# Patient Record
Sex: FEMALE | Race: WHITE
[De-identification: names, ages, dates, MRNs, and addresses within clinical notes are randomized per-mention and may not be internally consistent; named-entity substitution may affect disease eponyms.]

---

## 2017-06-20 ENCOUNTER — HOSPITAL ENCOUNTER (INPATIENT)
Dept: HOSPITAL 47 - 6SEL | Age: 82
LOS: 3 days | Discharge: HOME | DRG: 287 | End: 2017-06-23
Attending: INTERNAL MEDICINE | Admitting: INTERNAL MEDICINE
Payer: COMMERCIAL

## 2017-06-20 DIAGNOSIS — I51.81: Primary | ICD-10-CM

## 2017-06-20 DIAGNOSIS — R11.2: ICD-10-CM

## 2017-06-20 DIAGNOSIS — Z88.5: ICD-10-CM

## 2017-06-20 DIAGNOSIS — K04.7: ICD-10-CM

## 2017-06-20 DIAGNOSIS — I25.10: ICD-10-CM

## 2017-06-20 DIAGNOSIS — I48.2: ICD-10-CM

## 2017-06-20 DIAGNOSIS — I49.5: ICD-10-CM

## 2017-06-20 DIAGNOSIS — Z88.1: ICD-10-CM

## 2017-06-20 DIAGNOSIS — I48.0: ICD-10-CM

## 2017-06-20 DIAGNOSIS — Z79.01: ICD-10-CM

## 2017-06-20 DIAGNOSIS — R06.02: ICD-10-CM

## 2017-06-20 DIAGNOSIS — Z95.0: ICD-10-CM

## 2017-06-20 DIAGNOSIS — I10: ICD-10-CM

## 2017-06-20 DIAGNOSIS — Z79.899: ICD-10-CM

## 2017-06-20 DIAGNOSIS — R74.8: ICD-10-CM

## 2017-06-20 PROCEDURE — 93458 L HRT ARTERY/VENTRICLE ANGIO: CPT

## 2017-06-20 PROCEDURE — 85610 PROTHROMBIN TIME: CPT

## 2017-06-20 PROCEDURE — 85025 COMPLETE CBC W/AUTO DIFF WBC: CPT

## 2017-06-20 PROCEDURE — 80048 BASIC METABOLIC PNL TOTAL CA: CPT

## 2017-06-20 PROCEDURE — 94640 AIRWAY INHALATION TREATMENT: CPT

## 2017-06-20 RX ADMIN — NITROGLYCERIN SCH MG: 6.5 CAPSULE ORAL at 21:31

## 2017-06-20 RX ADMIN — ISODIUM CHLORIDE PRN MG: 0.03 SOLUTION RESPIRATORY (INHALATION) at 20:35

## 2017-06-20 RX ADMIN — PENICILLIN V POTASSIUM SCH MG: 250 TABLET, FILM COATED ORAL at 21:32

## 2017-06-20 RX ADMIN — BUDESONIDE SCH MG: 0.5 INHALANT ORAL at 20:35

## 2017-06-20 RX ADMIN — ACETAMINOPHEN PRN MG: 325 TABLET, FILM COATED ORAL at 18:28

## 2017-06-20 RX ADMIN — CEFAZOLIN SCH MLS/HR: 330 INJECTION, POWDER, FOR SOLUTION INTRAMUSCULAR; INTRAVENOUS at 18:30

## 2017-06-20 RX ADMIN — FLECAINIDE ACETATE SCH MG: 50 TABLET ORAL at 21:31

## 2017-06-21 LAB
ANION GAP SERPL CALC-SCNC: 11 MMOL/L
BASOPHILS # BLD AUTO: 0.1 K/UL (ref 0–0.2)
BASOPHILS NFR BLD AUTO: 1 %
BUN SERPL-SCNC: 14 MG/DL (ref 7–17)
CALCIUM SPEC-MCNC: 8.8 MG/DL (ref 8.4–10.2)
CH: 29.7
CHCM: 32
CHLORIDE SERPL-SCNC: 103 MMOL/L (ref 98–107)
CO2 SERPL-SCNC: 25 MMOL/L (ref 22–30)
EOSINOPHIL # BLD AUTO: 0.1 K/UL (ref 0–0.7)
EOSINOPHIL NFR BLD AUTO: 1 %
ERYTHROCYTE [DISTWIDTH] IN BLOOD BY AUTOMATED COUNT: 3.86 M/UL (ref 3.8–5.4)
ERYTHROCYTE [DISTWIDTH] IN BLOOD: 14.5 % (ref 11.5–15.5)
GLUCOSE SERPL-MCNC: 113 MG/DL (ref 74–99)
HCT VFR BLD AUTO: 36.1 % (ref 34–46)
HDW: 2.3
HGB BLD-MCNC: 11.4 GM/DL (ref 11.4–16)
INR PPP: 1.4 (ref ?–1.1)
LUC NFR BLD AUTO: 2 %
LYMPHOCYTES # SPEC AUTO: 1.3 K/UL (ref 1–4.8)
LYMPHOCYTES NFR SPEC AUTO: 17 %
MCH RBC QN AUTO: 29.4 PG (ref 25–35)
MCHC RBC AUTO-ENTMCNC: 31.5 G/DL (ref 31–37)
MCV RBC AUTO: 93.6 FL (ref 80–100)
MONOCYTES # BLD AUTO: 0.5 K/UL (ref 0–1)
MONOCYTES NFR BLD AUTO: 7 %
NEUTROPHILS # BLD AUTO: 5.3 K/UL (ref 1.3–7.7)
NEUTROPHILS NFR BLD AUTO: 71 %
NON-AFRICAN AMERICAN GFR(MDRD): 57
POTASSIUM SERPL-SCNC: 3.4 MMOL/L (ref 3.5–5.1)
PT BLD: 13.7 SEC (ref 9–12)
SODIUM SERPL-SCNC: 139 MMOL/L (ref 137–145)
WBC # BLD AUTO: 0.18 10*3/UL
WBC # BLD AUTO: 7.4 K/UL (ref 3.8–10.6)
WBC (PEROX): 7.52

## 2017-06-21 PROCEDURE — B2111ZZ FLUOROSCOPY OF MULTIPLE CORONARY ARTERIES USING LOW OSMOLAR CONTRAST: ICD-10-PCS

## 2017-06-21 PROCEDURE — 4A023N7 MEASUREMENT OF CARDIAC SAMPLING AND PRESSURE, LEFT HEART, PERCUTANEOUS APPROACH: ICD-10-PCS

## 2017-06-21 PROCEDURE — B2151ZZ FLUOROSCOPY OF LEFT HEART USING LOW OSMOLAR CONTRAST: ICD-10-PCS

## 2017-06-21 RX ADMIN — BUDESONIDE SCH MG: 0.5 INHALANT ORAL at 07:31

## 2017-06-21 RX ADMIN — ACETAMINOPHEN PRN MG: 325 TABLET, FILM COATED ORAL at 03:05

## 2017-06-21 RX ADMIN — Medication SCH UNIT: at 18:49

## 2017-06-21 RX ADMIN — PROPRANOLOL HYDROCHLORIDE SCH MG: 80 CAPSULE, EXTENDED RELEASE ORAL at 06:11

## 2017-06-21 RX ADMIN — PENICILLIN V POTASSIUM SCH MG: 250 TABLET, FILM COATED ORAL at 06:11

## 2017-06-21 RX ADMIN — NITROGLYCERIN SCH MG: 6.5 CAPSULE ORAL at 20:04

## 2017-06-21 RX ADMIN — ASPIRIN 81 MG CHEWABLE TABLET SCH MG: 81 TABLET CHEWABLE at 06:09

## 2017-06-21 RX ADMIN — PENICILLIN V POTASSIUM SCH MG: 250 TABLET, FILM COATED ORAL at 20:04

## 2017-06-21 RX ADMIN — NITROGLYCERIN SCH MG: 6.5 CAPSULE ORAL at 06:10

## 2017-06-21 RX ADMIN — FAMOTIDINE SCH MG: 20 TABLET, FILM COATED ORAL at 06:10

## 2017-06-21 RX ADMIN — FLECAINIDE ACETATE SCH MG: 50 TABLET ORAL at 20:04

## 2017-06-21 RX ADMIN — ACETAMINOPHEN PRN MG: 325 TABLET, FILM COATED ORAL at 20:03

## 2017-06-21 RX ADMIN — CEFAZOLIN SCH MLS/HR: 330 INJECTION, POWDER, FOR SOLUTION INTRAMUSCULAR; INTRAVENOUS at 06:09

## 2017-06-21 RX ADMIN — BUDESONIDE SCH: 0.5 INHALANT ORAL at 20:39

## 2017-06-21 RX ADMIN — FLECAINIDE ACETATE SCH MG: 50 TABLET ORAL at 06:10

## 2017-06-21 RX ADMIN — LOSARTAN POTASSIUM SCH MG: 50 TABLET, FILM COATED ORAL at 06:10

## 2017-06-21 RX ADMIN — ISODIUM CHLORIDE PRN MG: 0.03 SOLUTION RESPIRATORY (INHALATION) at 07:31

## 2017-06-21 RX ADMIN — CEFAZOLIN SCH MLS/HR: 330 INJECTION, POWDER, FOR SOLUTION INTRAMUSCULAR; INTRAVENOUS at 23:24

## 2017-06-21 NOTE — CC
DATE OF SERVICE:  





Ms. Joseph is an 83-year-old female with a known history of paroxysmal 

atrial fibrillation and permanent pacemaker implantation who presented 

to [facility] with symptoms of throat and chest 

discomfort, had minimal troponin elevation. She underwent an 

echocardiogram that revealed apical hypokinesis and her stress test 

showed evidence of a defect in the inferior wall with no significant 

ischemia. Those findings are new compared with her prior work-up and 

based on that, recommendation was made regarding cardiac 

catheterization. The procedure as well as the risks and complications 

were discussed with the patient who is in full understanding and 

agreement.  



PROCEDURE: Patient was brought to the cath lab in fasting semisedated 

state after receiving fentanyl and Benadryl and achieving moderate 

conscious sedated state, a 6 Spanish sheath was introduced in the right 

radial artery. Selective right and left coronary angiography was 

performed using 5 Spanish 3-1/2 Bend right and left Denis catheter. 

Multiple views of the coronary arteries including hemiaxial views were 

obtained. Following that, a 5 Spanish tight pigtail catheter was 

introduced into the left ventricle and a  30 degrees BURNS view of the 

left ventricle was obtained. Following that, catheter and sheaths were 

removed. Hemostasis was obtained with deployment of TR band. There 

were no immediate complications. Patient was returned to her room in 

stable condition. Of note, the patient received 4000 units of 

intravenous heparin as well as intra-arterial verapamil.  



FINDINGS:

LEFT MAIN: This is a large-size vessel bifurcating into left 

circumflex artery, left main coronary artery is without any 

significant obstructive coronary artery disease.  





LEFT ANTERIOR DESCENDING CORONARY ARTERY: This is a large-size vessel 

reaching towards the apex with a wrap around the apex segment, giving 

rise to a small size diagonal branch proximally and the proximal 

segment. There is 20 to 30% plaque. The rest of the vessel has no 

high-grade stenosis.  



LEFT CIRCUMFLEX: This is a nondominant vessel, large in caliber, 

giving rise to 2 obtuse marginal branches. The left circumflex has 

mild intimal disease of 20- 40% without any evidence of high-grade 

stenosis.  



RIGHT CORONARY ARTERY: This is a large dominant vessel, bifurcating 

into PDA and posterolateral segment and branches. The right coronary 

artery has mild plaque in the mid segment with 30%, the rest of the 

vessel has no high-grade stenosis.  



LEFT VENTRICULOGRAM: Left ventriculogram was performed in 30 degrees 

BURNS view and revealed anteroapical akinesis with an ejection fraction 

of 35%. There was no significant mitral regurgitation.  



HEMODYNAMICS: There was no gradient across the aortic valve. The left 

ventricle end-diastolic pressure was 20 mmHg.   



CONCLUSION:

1. Mild to moderate triple vessel coronary artery disease.

2. Anteroapical akinesis with severely impaired left ventricular systolic 
function. 



RECOMMENDATIONS: Those findings are consistent with takotsubo 

syndrome. I will maximize her medical therapy and depending on her 

progress, further recommendation will be made.  Those findings and 

recommendations were discussed with the patient and her family who are 

in full understanding and  agreement.  



Duration of the procedure:   20 minutes. 



[place]

## 2017-06-21 NOTE — LTR
June 21, 2017













RE:  OpalSusan







Dear Dr. Rees:



I had the pleasure of performing cardiac catheterization on Mr. Joseph 

at [facility] on the 21st of June and a full copy of 

procedure note will be forwarded to you.   



In brief, she was found to have mild to moderate triple vessel 

coronary artery disease with significant anteroapical hypokinesis 

consistent with takotsubo syndrome and based on those findings, I 

recommend to continue medical therapy with optimizing her treatment 

and depending on her progress, further recommendation will be made.  



Thank you again for allowing me to participate in her care. Please 

feel free to call for any questions.  



Sincerely, 







EMILIO MORIN MD

## 2017-06-22 LAB
ANION GAP SERPL CALC-SCNC: 10 MMOL/L
BUN SERPL-SCNC: 14 MG/DL (ref 7–17)
CALCIUM SPEC-MCNC: 9 MG/DL (ref 8.4–10.2)
CHLORIDE SERPL-SCNC: 105 MMOL/L (ref 98–107)
CO2 SERPL-SCNC: 26 MMOL/L (ref 22–30)
GLUCOSE SERPL-MCNC: 100 MG/DL (ref 74–99)
INR PPP: 1.1 (ref ?–1.1)
NON-AFRICAN AMERICAN GFR(MDRD): >60
POTASSIUM SERPL-SCNC: 3.7 MMOL/L (ref 3.5–5.1)
PT BLD: 11.5 SEC (ref 9–12)
SODIUM SERPL-SCNC: 141 MMOL/L (ref 137–145)

## 2017-06-22 RX ADMIN — NITROGLYCERIN SCH MG: 6.5 CAPSULE ORAL at 20:20

## 2017-06-22 RX ADMIN — ACETAMINOPHEN PRN MG: 325 TABLET, FILM COATED ORAL at 12:49

## 2017-06-22 RX ADMIN — NITROGLYCERIN SCH MG: 6.5 CAPSULE ORAL at 09:43

## 2017-06-22 RX ADMIN — CEFAZOLIN SCH: 330 INJECTION, POWDER, FOR SOLUTION INTRAMUSCULAR; INTRAVENOUS at 12:44

## 2017-06-22 RX ADMIN — PENICILLIN V POTASSIUM SCH MG: 250 TABLET, FILM COATED ORAL at 20:20

## 2017-06-22 RX ADMIN — ASPIRIN 81 MG CHEWABLE TABLET SCH MG: 81 TABLET CHEWABLE at 09:42

## 2017-06-22 RX ADMIN — Medication SCH UNIT: at 09:43

## 2017-06-22 RX ADMIN — ISODIUM CHLORIDE PRN MG: 0.03 SOLUTION RESPIRATORY (INHALATION) at 20:19

## 2017-06-22 RX ADMIN — ATORVASTATIN CALCIUM SCH MG: 20 TABLET, FILM COATED ORAL at 09:42

## 2017-06-22 RX ADMIN — FAMOTIDINE SCH MG: 20 TABLET, FILM COATED ORAL at 09:44

## 2017-06-22 RX ADMIN — PENICILLIN V POTASSIUM SCH MG: 250 TABLET, FILM COATED ORAL at 09:43

## 2017-06-22 RX ADMIN — FLECAINIDE ACETATE SCH MG: 50 TABLET ORAL at 20:20

## 2017-06-22 RX ADMIN — BUDESONIDE SCH MG: 0.5 INHALANT ORAL at 07:13

## 2017-06-22 RX ADMIN — FLECAINIDE ACETATE SCH MG: 50 TABLET ORAL at 09:42

## 2017-06-22 RX ADMIN — ISODIUM CHLORIDE PRN MG: 0.03 SOLUTION RESPIRATORY (INHALATION) at 07:13

## 2017-06-22 RX ADMIN — BUDESONIDE SCH MG: 0.5 INHALANT ORAL at 20:19

## 2017-06-22 RX ADMIN — LOSARTAN POTASSIUM SCH MG: 50 TABLET, FILM COATED ORAL at 09:41

## 2017-06-22 RX ADMIN — PROPRANOLOL HYDROCHLORIDE SCH MG: 80 CAPSULE, EXTENDED RELEASE ORAL at 09:41

## 2017-06-22 NOTE — P.PN
Subjective


Principal diagnosis: 





Stress cardiomyopathy


This is an 83-year-old  female with known history of paroxysmal atrial 

fibrillation, prior pacemaker, who initially presented to [facility] with symptoms of chest discomfort and minimal troponin elevation.  She 

underwent an echocardiogram with Doppler study which revealed apical 

hypokinesia and stress test revealed evidence of a defect in the inferior wall 

with no significant ischemia.  She underwent a cardiac catheterization 

yesterday by Dr. Syed which revealed mild to moderate triple-vessel coronary 

artery disease with anterior septal akinesia and severely impaired left 

ventricular systolic function suggesting stress cardiomyopathy.  Patient was 

seen and examined this morning, denied any chest pain or difficulty in 

breathing.  She has been up ambulating in the schroeder without any difficulty.  The 

pressure 134/70 with a heart rate in the 60s, 97% on room air.  Laboratory data

, sodium 141, potassium 3.7, BUN 14, creatinine 0.8.








Objective





- Vital Signs


Vital signs: 


 Vital Signs











Temp  96.5 F L  06/22/17 08:00


 


Pulse  64   06/22/17 12:00


 


Resp  18   06/22/17 12:00


 


BP  135/76   06/22/17 12:00


 


Pulse Ox  97   06/22/17 12:00








 Intake & Output











 06/21/17 06/22/17 06/22/17





 18:59 06:59 18:59


 


Intake Total 440 375 275


 


Balance 440 375 275


 


Weight  83 kg 


 


Intake:   


 


  IV  375 75


 


    Sodium Chloride 0.9% 1,  375 75





    000 ml @ 75 mls/hr IV .   





    [id number] Rx#:[id number]   


 


  Oral 440  200


 


Other:   


 


  Voiding Method Toilet Toilet 


 


  # Voids 1  














- Exam





PHYSICAL EXAMINATION: 





HEENT: Head is atraumatic, normocephalic.  Pupils equal, round.  Neck is 

supple.  There is no elevated jugular venous pressure.





HEART EXAMINATION: Heart S1, S2 normal.  No murmur or gallop heard.





CHEST EXAMINATION: Lungs are clear to auscultation and precussion. No chest 

wall tenderness is noted on palpation or with deep breathing.





ABDOMEN:  Soft, nontender. Bowel sounds are heard. No organomegaly noted].





Right radial site clean and dry, good distal pulse.


 


EXTREMITIES:[ 2+ peripheral pulses with no evidence of peripheral edema and no 

calf tenderness noted].





NEUROLOGIC [patient is awake, alert and oriented -3.]


 


.


 








- Labs


CBC & Chem 7: 


 06/21/17 06:22





 06/22/17 06:17


Labs: 


 Abnormal Lab Results - Last 24 Hours (Table)











  06/22/17 Range/Units





  06:17 


 


Glucose  100 H  (74-99)  mg/dL














Assessment and Plan


(1) S/P cardiac cath


Status: Acute   





(2) Stress-induced cardiomyopathy


Status: Acute   





(3) Paroxysmal a-fib


Status: Acute   





(4) Pacemaker


Status: Acute   





(5) Chest pain


Status: Acute   


Plan: 


From cardiology's perspective, patient has been encouraged to be up ambulating 

in the hallway today as tolerated.  We will continue current dose of beta 

blocker, angiotensin blocker, Tambocor, aspirin, Lipitor, we will also give the 

patient 5 mg of Coumadin today.  INR is 1.1.  Monitor daily INRs and lytes BUN 

and creatinine.  Plan for possible discharge home in the next 24-48 hours if 

stable.








DNP note has been reviewed, I agree with a documented findings and plan of 

care.  Patient was seen and examined.

## 2017-06-23 VITALS — DIASTOLIC BLOOD PRESSURE: 75 MMHG | SYSTOLIC BLOOD PRESSURE: 135 MMHG | HEART RATE: 68 BPM | RESPIRATION RATE: 17 BRPM

## 2017-06-23 VITALS — TEMPERATURE: 96.9 F

## 2017-06-23 LAB
ANION GAP SERPL CALC-SCNC: 9 MMOL/L
BUN SERPL-SCNC: 16 MG/DL (ref 7–17)
CALCIUM SPEC-MCNC: 9.1 MG/DL (ref 8.4–10.2)
CHLORIDE SERPL-SCNC: 105 MMOL/L (ref 98–107)
CO2 SERPL-SCNC: 24 MMOL/L (ref 22–30)
GLUCOSE SERPL-MCNC: 92 MG/DL (ref 74–99)
NON-AFRICAN AMERICAN GFR(MDRD): 60
POTASSIUM SERPL-SCNC: 4.1 MMOL/L (ref 3.5–5.1)
SODIUM SERPL-SCNC: 138 MMOL/L (ref 137–145)

## 2017-06-23 RX ADMIN — BUDESONIDE SCH MG: 0.5 INHALANT ORAL at 08:09

## 2017-06-23 RX ADMIN — FLECAINIDE ACETATE SCH MG: 50 TABLET ORAL at 09:46

## 2017-06-23 RX ADMIN — ACETAMINOPHEN PRN MG: 325 TABLET, FILM COATED ORAL at 03:46

## 2017-06-23 RX ADMIN — ATORVASTATIN CALCIUM SCH MG: 20 TABLET, FILM COATED ORAL at 09:47

## 2017-06-23 RX ADMIN — PENICILLIN V POTASSIUM SCH MG: 250 TABLET, FILM COATED ORAL at 09:47

## 2017-06-23 RX ADMIN — Medication SCH UNIT: at 09:47

## 2017-06-23 RX ADMIN — LOSARTAN POTASSIUM SCH MG: 50 TABLET, FILM COATED ORAL at 09:46

## 2017-06-23 RX ADMIN — PROPRANOLOL HYDROCHLORIDE SCH MG: 80 CAPSULE, EXTENDED RELEASE ORAL at 09:46

## 2017-06-23 RX ADMIN — ASPIRIN 81 MG CHEWABLE TABLET SCH MG: 81 TABLET CHEWABLE at 09:47

## 2017-06-23 RX ADMIN — ISODIUM CHLORIDE PRN MG: 0.03 SOLUTION RESPIRATORY (INHALATION) at 08:08

## 2017-06-23 RX ADMIN — FAMOTIDINE SCH MG: 20 TABLET, FILM COATED ORAL at 09:47

## 2017-06-23 RX ADMIN — NITROGLYCERIN SCH MG: 6.5 CAPSULE ORAL at 09:47

## 2017-06-23 NOTE — P.PN
Subjective


Principal diagnosis: 





Stress cardiomyopathy


Discharge summary





This is an 83-year-old  female with known history of paroxysmal atrial 

fibrillation, prior pacemaker, who initially presented to [facility] with symptoms of chest discomfort and minimal troponin elevation.  She 

underwent an echocardiogram with Doppler study which revealed apical 

hypokinesia and stress test revealed evidence of a defect in the inferior wall 

with no significant ischemia.  She underwent a cardiac catheterization  by Dr. Syed which revealed mild to moderate triple-vessel coronary artery disease 

with anterior septal akinesia and severely impaired left ventricular systolic 

function suggesting stress cardiomyopathy.  Patient overall has done quite 

well.  She does complain of minimal shortness of breath upon wakening in the 

morning.  We will resume her home dose of Lasix.  She may be able to be 

discharged home today we will make her a follow-up appointment to see Dr. Syed in the office in one to 2 weeks.





Objective





- Vital Signs


Vital signs: 


 Vital Signs











Temp  96.9 F L  06/23/17 04:00


 


Pulse  68   06/23/17 12:00


 


Resp  17   06/23/17 12:00


 


BP  135/75   06/23/17 12:00


 


Pulse Ox  99   06/23/17 12:00








 Intake & Output











 06/22/17 06/23/17 06/23/17





 18:59 06:59 18:59


 


Intake Total 535 20 


 


Balance 535 20 


 


Weight  84 kg 


 


Intake:   


 


  IV 75 20 


 


    0.9% NS FLUSH  20 


 


    Sodium Chloride 0.9% 1, 75  





    000 ml @ 75 mls/hr IV .   





    [id number] [facility] Rx#:[id number]   


 


  Oral 460  


 


Other:   


 


  Voiding Method  Toilet 


 


  # Voids 2 1 














- Exam





PHYSICAL EXAMINATION: 





HEENT: Head is atraumatic, normocephalic.  Pupils equal, round.  Neck is 

supple.  There is no elevated jugular venous pressure.





HEART EXAMINATION: Heart S1, S2 normal.  No murmur or gallop heard.





CHEST EXAMINATION: Lungs are clear to auscultation and precussion. No chest 

wall tenderness is noted on palpation or with deep breathing.





ABDOMEN:  Soft, nontender. Bowel sounds are heard. No organomegaly noted].





Right radial site clean and dry, good distal pulse.


 


EXTREMITIES:[ 2+ peripheral pulses with no evidence of peripheral edema and no 

calf tenderness noted].





NEUROLOGIC [patient is awake, alert and oriented -3.]


 


.


 








- Labs


CBC & Chem 7: 


 06/21/17 06:22





 06/23/17 06:45





Assessment and Plan


(1) S/P cardiac cath


Status: Acute   





(2) Stress-induced cardiomyopathy


Status: Acute   





(3) Paroxysmal a-fib


Status: Acute   





(4) Pacemaker


Status: Acute   





(5) Chest pain


Status: Acute   


Plan: 


Patient may be able to be discharged home today.  We will make her a follow-up 

appointment to see Dr. Syed in the office in one to 2 weeks.  PT/INR on 

Wednesday.  Discharge medications include aspirin 81 mg daily, Lipitor 20 mg 

daily, record inhaler, vitamin D, Pepcid 20 mg daily, Tambocor 50 mg one tablet 

by mouth twice a day, Lasix 40 mg daily, Cozaar 100 mg daily, penicillin as at 

home, propranolol LA 80 mg daily, Aldactone 25 mg daily, Coumadin as at home.  

Patient has been educated regarding her medication as well as her stress 

cardiomyopathy.





DNP note has been reviewed, I agree with a documented findings and plan of 

care.  Patient was seen and examined.

## 2017-08-02 ENCOUNTER — HOSPITAL ENCOUNTER (OUTPATIENT)
Dept: HOSPITAL 47 - CATHEP | Age: 82
Discharge: HOME | End: 2017-08-02
Payer: MEDICARE

## 2017-08-02 VITALS — TEMPERATURE: 98.1 F

## 2017-08-02 VITALS — RESPIRATION RATE: 18 BRPM

## 2017-08-02 VITALS — DIASTOLIC BLOOD PRESSURE: 73 MMHG | HEART RATE: 64 BPM | SYSTOLIC BLOOD PRESSURE: 170 MMHG

## 2017-08-02 VITALS — BODY MASS INDEX: 25.8 KG/M2

## 2017-08-02 DIAGNOSIS — Z87.891: ICD-10-CM

## 2017-08-02 DIAGNOSIS — I51.81: ICD-10-CM

## 2017-08-02 DIAGNOSIS — I25.10: ICD-10-CM

## 2017-08-02 DIAGNOSIS — J44.9: ICD-10-CM

## 2017-08-02 DIAGNOSIS — Z79.899: ICD-10-CM

## 2017-08-02 DIAGNOSIS — Z45.010: Primary | ICD-10-CM

## 2017-08-02 DIAGNOSIS — Z88.5: ICD-10-CM

## 2017-08-02 DIAGNOSIS — I48.92: ICD-10-CM

## 2017-08-02 DIAGNOSIS — Z79.82: ICD-10-CM

## 2017-08-02 DIAGNOSIS — Z88.1: ICD-10-CM

## 2017-08-02 DIAGNOSIS — I10: ICD-10-CM

## 2017-08-02 DIAGNOSIS — Z79.01: ICD-10-CM

## 2017-08-02 DIAGNOSIS — I48.0: ICD-10-CM

## 2017-08-02 DIAGNOSIS — Z82.49: ICD-10-CM

## 2017-08-02 LAB
ANION GAP SERPL CALC-SCNC: 8 MMOL/L
BASOPHILS # BLD AUTO: 0.1 K/UL (ref 0–0.2)
BASOPHILS NFR BLD AUTO: 1 %
BUN SERPL-SCNC: 27 MG/DL (ref 7–17)
CALCIUM SPEC-MCNC: 9.2 MG/DL (ref 8.4–10.2)
CH: 29.2
CHCM: 32.2
CHLORIDE SERPL-SCNC: 104 MMOL/L (ref 98–107)
CO2 SERPL-SCNC: 29 MMOL/L (ref 22–30)
EOSINOPHIL # BLD AUTO: 0.4 K/UL (ref 0–0.7)
EOSINOPHIL NFR BLD AUTO: 3 %
ERYTHROCYTE [DISTWIDTH] IN BLOOD BY AUTOMATED COUNT: 4.7 M/UL (ref 3.8–5.4)
ERYTHROCYTE [DISTWIDTH] IN BLOOD: 14 % (ref 11.5–15.5)
GLUCOSE SERPL-MCNC: 90 MG/DL (ref 74–99)
HCT VFR BLD AUTO: 42.9 % (ref 34–46)
HDW: 2.31
HGB BLD-MCNC: 14 GM/DL (ref 11.4–16)
INR PPP: 1.2 (ref ?–1.2)
LUC NFR BLD AUTO: 2 %
LYMPHOCYTES # SPEC AUTO: 2 K/UL (ref 1–4.8)
LYMPHOCYTES NFR SPEC AUTO: 18 %
MCH RBC QN AUTO: 29.8 PG (ref 25–35)
MCHC RBC AUTO-ENTMCNC: 32.7 G/DL (ref 31–37)
MCV RBC AUTO: 91.2 FL (ref 80–100)
MONOCYTES # BLD AUTO: 0.5 K/UL (ref 0–1)
MONOCYTES NFR BLD AUTO: 5 %
NEUTROPHILS # BLD AUTO: 7.7 K/UL (ref 1.3–7.7)
NEUTROPHILS NFR BLD AUTO: 71 %
NON-AFRICAN AMERICAN GFR(MDRD): 46
POTASSIUM SERPL-SCNC: 4 MMOL/L (ref 3.5–5.1)
PT BLD: 11.9 SEC (ref 9–12)
SODIUM SERPL-SCNC: 141 MMOL/L (ref 137–145)
WBC # BLD AUTO: 0.23 10*3/UL
WBC # BLD AUTO: 10.8 K/UL (ref 3.8–10.6)
WBC (PEROX): 10.77

## 2017-08-02 PROCEDURE — 85610 PROTHROMBIN TIME: CPT

## 2017-08-02 PROCEDURE — 80048 BASIC METABOLIC PNL TOTAL CA: CPT

## 2017-08-02 PROCEDURE — 99153 MOD SED SAME PHYS/QHP EA: CPT

## 2017-08-02 PROCEDURE — 85025 COMPLETE CBC W/AUTO DIFF WBC: CPT

## 2017-08-02 PROCEDURE — 99152 MOD SED SAME PHYS/QHP 5/>YRS: CPT

## 2017-08-02 PROCEDURE — 33228 REMV&REPLC PM GEN DUAL LEAD: CPT

## 2017-08-02 NOTE — P.PCN
Date of Procedure: 08/02/17


Preoperative Diagnosis: 


Battery depletion


Postoperative Diagnosis: 


The same


Procedure(s) Performed: 


Pacemaker generator change


Implants: 





Indications for Procedure: 





Operative Findings: 





Description of Procedure: 


HISTORY: This is a 83-year-old female with history of dual-chamber pacemaker 

implantation and paroxysmal atrial fibrillation was noted to have evidence of 

battery depletion.  Patient is brought in for elective battery replacement.





CONSENT: I have discussed the risks and benefits as related to the above 

mentioned procedure and  both sedation/analgesia as well as necessary blood 

product administration.  The patient has indicated understanding and acceptance 

of the risks of the procedure discussed.





PROCEDURE: Patient was brought to the lab in a fasting state.  Patient was 

given IV Versed and fentanyl for sedation.  The skin over the existing pulse 

generator was infiltrated with lidocaine.  An incision was made in the skin and 

was deepened until the pectoral fascia was exposed.  Hemostasis was obtained.  

The existing pulse generator was pulled out of the pocket.  The leads were 

disconnected and were checked for thresholds.  


Conscious Sedation: Versed 0.5 mg


Fentanyl: 25 g


Duration : 32 minutes   





             THRESHOLDS: 


                                      ATRIAL: 0.7 V at a pulse width of 0.5 ms.

  Impedance is 533 ohms    P-wave: 1 mV


                                      VENTRICULAR: 0.8 V at a pulse width of 

0.5 ms the impedance is 589 ohms  R-wave: 4.8 mV


               THE LEADS:


                                       ATRIAL: This is manufactured by 

[employer].  Model number is 5076.  Serial number is [id number]


                                       VENTRICULAR: This is manufactured by 

[employer].  Model number is 5092.  Serial number is [id number].





                 THE EXPLANTED DEVICE: This is manufactured by [employer].  

Model number is [id number] and the serial number is [id number]


                  THE NEW DEVICE: This is manufactured by [employer].  

Model number is L101.  Serial number is [id number].





The leads were then connected to a new pulse generator. Pacemaker seems to 

function normally.  The pocket was irrigated with antibiotics.  The pocket was 

closed in the usual fashion.  Pectoral fascia was closed with 2-0 Prolene, the 

subcutaneous tissue was closed with 3-0 Prolene and the skin was closed with 4-

0 Prolene.  Patient tolerated the procedure well .  Patient will be monitored 

on the telemetry unit for 2-3 hours.  If stable patient be discharged home 

later today.





PLAN: Patient will be monitored for the next several hours.  If stable, she'll 

be discharged home.  She'll continue home medications including Coumadin.  PT/

INR on coming Monday.  Follow-up with the Dr. Syed in about one week





FALLOW UP: One week

## 2018-07-06 ENCOUNTER — HOSPITAL ENCOUNTER (OUTPATIENT)
Dept: HOSPITAL 47 - RADUSWWP | Age: 83
Discharge: HOME | End: 2018-07-06
Attending: FAMILY MEDICINE
Payer: MEDICARE

## 2018-07-06 DIAGNOSIS — R10.816: Primary | ICD-10-CM

## 2018-07-06 DIAGNOSIS — Z98.890: ICD-10-CM

## 2018-07-06 PROCEDURE — 76700 US EXAM ABDOM COMPLETE: CPT

## 2018-07-06 NOTE — US
EXAMINATION TYPE: US abdomen complete

 

DATE OF EXAM: 7/6/2018

 

COMPARISON: NONE

 

CLINICAL HISTORY: R10.816 epigastric abdominal tenderness. Elderly patient with abd tenderness

 

EXAM MEASUREMENTS:

 

Liver Length:  13.9cm cm   

Gallbladder:  Surgically absent     

CBD:  0.4 cm

Spleen:  7.2 cm   

Right Kidney:  8.8 x 4.2 x 4.8 cm 

Left Kidney:  8.8 x 5.6 x 4.5 cm   

 

Some exam limitations due to overlying bowel gas and abd tenderness.  

 

Pancreas:  Obscured by bowel gas

Liver: Echotexture is somewhat heterogeneous.

Gallbladder:  Surgically absent

CBD:  wnl 

Spleen:  seen with a few scattered echogenic foci

Right Kidney:  wnl   

Left Kidney:  seen with a 1.2cm lateral cortical cyst   

Upper IVC:  wnl  

Abd Aorta:  calcified and ectatic

 

 

 

Kidneys show normal cortical medullary differentiation. There is no ascites.

 

IMPRESSION: Correlate for possible hepatocellular disease. Postop changes. Limited exam.

## 2018-07-25 ENCOUNTER — HOSPITAL ENCOUNTER (OUTPATIENT)
Dept: HOSPITAL 47 - LABWHC1 | Age: 83
Discharge: HOME | End: 2018-07-25
Payer: MEDICARE

## 2018-07-25 DIAGNOSIS — I25.10: Primary | ICD-10-CM

## 2018-07-25 LAB
ALBUMIN SERPL-MCNC: 3.9 G/DL (ref 3.5–5)
ALP SERPL-CCNC: 63 U/L (ref 38–126)
ALT SERPL-CCNC: 33 U/L (ref 9–52)
ANION GAP SERPL CALC-SCNC: 9 MMOL/L
AST SERPL-CCNC: 31 U/L (ref 14–36)
BUN SERPL-SCNC: 33 MG/DL (ref 7–17)
CALCIUM SPEC-MCNC: 9.5 MG/DL (ref 8.4–10.2)
CHLORIDE SERPL-SCNC: 100 MMOL/L (ref 98–107)
CHOLEST SERPL-MCNC: 130 MG/DL (ref ?–200)
CO2 SERPL-SCNC: 28 MMOL/L (ref 22–30)
ERYTHROCYTE [DISTWIDTH] IN BLOOD BY AUTOMATED COUNT: 3.82 M/UL (ref 3.8–5.4)
ERYTHROCYTE [DISTWIDTH] IN BLOOD: 13.7 % (ref 11.5–15.5)
GLUCOSE SERPL-MCNC: 97 MG/DL (ref 74–99)
HCT VFR BLD AUTO: 34.4 % (ref 34–46)
HDLC SERPL-MCNC: 55 MG/DL (ref 40–60)
HGB BLD-MCNC: 11.1 GM/DL (ref 11.4–16)
LDLC SERPL CALC-MCNC: 60 MG/DL (ref 0–99)
MCH RBC QN AUTO: 29.1 PG (ref 25–35)
MCHC RBC AUTO-ENTMCNC: 32.3 G/DL (ref 31–37)
MCV RBC AUTO: 90.1 FL (ref 80–100)
PLATELET # BLD AUTO: 202 K/UL (ref 150–450)
POTASSIUM SERPL-SCNC: 4.9 MMOL/L (ref 3.5–5.1)
PROT SERPL-MCNC: 6.4 G/DL (ref 6.3–8.2)
SODIUM SERPL-SCNC: 137 MMOL/L (ref 137–145)
TRIGL SERPL-MCNC: 77 MG/DL (ref ?–150)
WBC # BLD AUTO: 5.7 K/UL (ref 3.8–10.6)

## 2018-07-25 PROCEDURE — 80053 COMPREHEN METABOLIC PANEL: CPT

## 2018-07-25 PROCEDURE — 36415 COLL VENOUS BLD VENIPUNCTURE: CPT

## 2018-07-25 PROCEDURE — 80061 LIPID PANEL: CPT

## 2018-07-25 PROCEDURE — 85027 COMPLETE CBC AUTOMATED: CPT

## 2018-08-04 ENCOUNTER — HOSPITAL ENCOUNTER (EMERGENCY)
Dept: HOSPITAL 47 - EC | Age: 83
Discharge: HOME | End: 2018-08-04
Payer: MEDICARE

## 2018-08-04 VITALS
TEMPERATURE: 98.3 F | DIASTOLIC BLOOD PRESSURE: 63 MMHG | RESPIRATION RATE: 18 BRPM | HEART RATE: 65 BPM | SYSTOLIC BLOOD PRESSURE: 114 MMHG

## 2018-08-04 DIAGNOSIS — Z79.51: ICD-10-CM
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
## 2018-08-04 DIAGNOSIS — J44.9: ICD-10-CM

## 2018-08-04 DIAGNOSIS — I25.2: ICD-10-CM

## 2018-08-04 DIAGNOSIS — S80.12XA: ICD-10-CM

## 2018-08-04 DIAGNOSIS — Z79.01: ICD-10-CM

## 2018-08-04 DIAGNOSIS — I34.1: ICD-10-CM

## 2018-08-04 DIAGNOSIS — I10: ICD-10-CM

## 2018-08-04 DIAGNOSIS — Z87.891: ICD-10-CM

## 2018-08-04 DIAGNOSIS — Z88.1: ICD-10-CM

## 2018-08-04 DIAGNOSIS — I25.10: ICD-10-CM

## 2018-08-04 DIAGNOSIS — M19.90: ICD-10-CM

## 2018-08-04 DIAGNOSIS — Z95.818: ICD-10-CM

## 2018-08-04 DIAGNOSIS — S90.122A: Primary | ICD-10-CM

## 2018-08-04 DIAGNOSIS — Z88.5: ICD-10-CM
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
## 2018-08-04 DIAGNOSIS — I48.0: ICD-10-CM

## 2018-08-04 DIAGNOSIS — Z95.0: ICD-10-CM

## 2018-08-04 DIAGNOSIS — Z79.899: ICD-10-CM
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
## 2018-08-04 PROCEDURE — 99283 EMERGENCY DEPT VISIT LOW MDM: CPT

## 2018-08-04 NOTE — ED
Lower Extremity Injury HPI





- General


Chief Complaint: Extremity Injury, Lower


Stated Complaint: Foot injury


Time Seen by Provider: 08/04/18 21:23


Source: patient, RN notes reviewed


Mode of arrival: ambulatory


Limitations: no limitations





- History of Present Illness


Initial Comments: 


This is an 84-year-old female who presents to the emergency department with 

chief complaint of right foot injury.  Patient states that at approximately 3 

PM she was walking without a walker.  She states that she sometimes has 

difficulty picking up her legs and a couple of her toes on her right foot 

caught the wheel of her walker that was sitting nearby.  Patient states that 

she fell forward but did not fall to the ground as she fell up against a chair.

  Patient reports pain to the third and fourth toes on her right foot.  She 

states that she is having difficulty ambulating due to the pain.  Does state 

that she also developed a bruise to her left ankle, however denies any pain.  

Denies recent fevers or chills, chest pain or shortness of breath, abdominal 

pain, nausea or vomiting, weakness, numbness or tingling.





- Related Data


 Home Medications











 Medication  Instructions  Recorded  Confirmed


 


Flecainide [Tambocor] 50 mg PO Q12HR 07/26/14 08/02/17


 


Warfarin [Coumadin] 5 mg PO DAILY 07/26/14 07/28/17


 


Furosemide [Lasix] 40 mg PO DAILY 08/24/15 08/02/17


 


Losartan Potassium [Cozaar] 100 mg PO DAILY 08/24/15 08/02/17


 


Acetaminophen Tab [Tylenol Tab] 500 mg PO Q6H PRN 06/20/17 07/28/17


 


Albuterol Inhaler [Ventolin Hfa 2 puff INHALATION RT-QID PRN 06/20/17 07/28/17





Inhaler]   


 


Budesonide [Pulmicort Flexhaler] 2 puff INHALATION RT-BID 06/20/17 08/02/17


 


Calcium Carbonate [Calcium] 600 mg PO DAILY 06/20/17 08/02/17


 


Cholecalciferol [Vitamin D3] 1,000 unit PO DAILY 06/20/17 08/02/17


 


Glucosamine Sulfate 1,000 mg PO DAILY 06/20/17 08/02/17


 


Propranolol HCl [Inderal LA] 80 mg PO DAILY 06/20/17 08/02/17


 


Vitamin B Complex 1 cap PO DAILY 06/20/17 08/02/17


 


Vits A,C,E/Lutein/Minerals 1 tab PO DAILY 06/20/17 08/02/17





[Ocuvite with Lutein Tablet]   


 


Ciprofloxacin HCl [Cipro] 500 mg PO DAILY 07/28/17 08/02/17








 Previous Rx's











 Medication  Instructions  Recorded


 


Aspirin 81 mg PO DAILY #30 06/23/17


 


Atorvastatin [Lipitor] 20 mg PO DAILY #30 tab 06/23/17


 


Famotidine [Pepcid] 20 mg PO DAILY  tab 06/23/17


 


Spironolactone [Aldactone] 25 mg PO DAILY #30 tab 06/23/17











 Allergies











Allergy/AdvReac Type Severity Reaction Status Date / Time


 


clindamycin AdvReac Severe Chest Pain Verified 08/04/18 19:55





   Followed  





   by Heart  





   Attack  


 


narcotics AdvReac Severe Can not Uncoded 08/04/18 19:55





   tolerate  














Review of Systems


ROS Statement: 


Those systems with pertinent positive or pertinent negative responses have been 

documented in the HPI.





ROS Other: All systems not noted in ROS Statement are negative.





Past Medical History


Past Medical History: Coronary Artery Disease (CAD), Chest Pain / Angina, COPD, 

Eye Disorder, Hypertension, Myocardial Infarction (MI), Mitral Valve Prolapse (

MVP), Osteoarthritis (OA)


Additional Past Medical History / Comment(s): MITRAL VALVE PROLAPSE, MAC 

DEGERATION, PAST PAROXYSMAL AFIB, CHRONIC BRONCHITIS, PNE X3, STRESS TEST, 

SHINGLE X2 IN 1975, HIATAL HERNIA, INCONT OF URINE WEARS A BRIEF.


Last Myocardial Infarction Date:: UNK


History of Any Multi-Drug Resistant Organisms: None Reported


Past Surgical History: Adenoidectomy, Appendectomy, Cholecystectomy, Heart 

Catheterization, Pacemaker, Tonsillectomy


Additional Past Surgical History / Comment(s): CARDIAC ABLATION, RT KNEE 

ARTHROSCOPY, OVARIES REMOVED 1996,LT PINKY FINGER SX, ROBERT BUNIONECTOMY/HAMMER 

TOE SX, ROBERT CATARACTS, X3 ABD HERNIA SX.


Past Anesthesia/Blood Transfusion Reactions: Previous Problems w/ Anesthesia


Additional Past Anesthesia/Blood Transfusion Reaction / Comment(s): DIFFICULTY 

WAKING UP AFTER RT KNEE SX.   PAST BLOOD TRANSFUSION-NO REACTION


Type of Cardiac Device: Permanent Pacemaker


Device Placement Date:: 2009


Past Psychological History: No Psychological Hx Reported


Smoking Status: Former smoker


Past Alcohol Use History: Occasional


Past Drug Use History: None Reported





General Exam





- General Exam Comments


Initial Comments: 





General: Awake and alert, well-developed; in no apparent distress.  Sitting 

comfortably in wheelchair.


HEENT: Head atraumatic, normocephalic. Pupils are equal, round and reactive to 

light. Extraocular movements intact. Oropharynx moist without erythema or 

exudate. 


Neck: Supple. Normal ROM. 


Cardiovascular: Regular rate and rhythm. No murmurs, rubs or gallops. Chest 

symmetrical.  


Respiratory: Lungs clear to auscultation bilaterally. No wheezes, rales or 

rhonchi. Normal respiratory effort with no use of accessory muscles. 


Musculoskeletal: Limited range of motion of toes 3 and 4 on the right foot due 

to pain.  Both toes are ecchymotic and tender on palpation.  No tenderness on 

palpation of the rest of the foot or ankle.  Sensation is intact.  No obvious 

source deformities.  Pedal pulses are 2+ equal and palpable bilaterally.


Skin: Pink, warm and dry. Area of ecchymosis to the left medial distal leg. 


Neurological: Alert and oriented x3. CN II-XII grossly intact. Speech is fluent 

and answers are appropriate. No focal neuro deficits. 


Psychiatric: Normal mood and affect. No overt signs of depression or anxiety 

noted. 











Limitations: no limitations





Course


 Vital Signs











  08/04/18





  19:50


 


Temperature 98.3 F


 


Pulse Rate 65


 


Respiratory 18





Rate 


 


Blood Pressure 114/63


 


O2 Sat by Pulse 100





Oximetry 














Procedures





- Orthopedic Splinting/Casting


  ** Injury #1


Side: right


Lower Extremity Injury Location: toe


Lower Extremity Immobilizer: hari tape





Medical Decision Making





- Medical Decision Making


Is is an 84-year-old female who presents to the emergency department with chief 

complaint of toe injury.  Patient states that she fell today and caught her 

third and fourth toes on the right foot on the wheel of her walker.  Denies any 

other injuries.  There is diffuse bruising and tenderness of digits 3 and 4 on 

the right foot.  Patient is neurovascularly intact.  X-ray was obtained which 

revealed no acute osseous abnormalities.  Patient's toes were hari taped.  

Recommended rest, ice, and elevation.  Vitals are stable and patient is in no 

acute distress.  She will be discharged home at this time.  All questions were 

answered.








- Radiology Data


Radiology results: report reviewed


X-ray right foot impression: No acute fractures evident.  Post bunionectomy 

changes.  Loss of plantar arch.





Disposition


Clinical Impression: 


 Contusion, toes





Disposition: HOME SELF-CARE


Condition: Good


Instructions:  Foot Contusion (ED)


Additional Instructions: 


Please rest, ice, elevate.  Please follow up with primary care provider within 1

-2 days. Return to emergency department if symptoms should worsen or any 

concerns arise. 


Is patient prescribed a controlled substance at d/c from ED?: No


Referrals: 


Car Rees DO [Primary Care Provider] - 1-2 days


Time of Disposition: 22:08

## 2018-08-04 NOTE — XR
EXAMINATION TYPE: XR foot complete RT

 

DATE OF EXAM: 8/4/2018

 

COMPARISON: None

 

HISTORY: Pain

 

TECHNIQUE: Three-view right foot

 

FINDINGS: There are prior bunionectomy. There is some valgus deformity of the first digit. Joint spac
es are preserved. No acute fractures are evident. There is flattening of the plantar arch.

 

 

IMPRESSION:

1.  No acute fractures evident.

2. Post bunionectomy changes.

3. Loss of plantar arch

## 2019-07-20 ENCOUNTER — HOSPITAL ENCOUNTER (EMERGENCY)
Dept: HOSPITAL 47 - EC | Age: 84
Discharge: OTHER INSTITUTION | End: 2019-07-20
Payer: COMMERCIAL

## 2019-07-20 VITALS
HEART RATE: 66 BPM | DIASTOLIC BLOOD PRESSURE: 60 MMHG | TEMPERATURE: 98 F | RESPIRATION RATE: 18 BRPM | SYSTOLIC BLOOD PRESSURE: 133 MMHG

## 2019-07-20 DIAGNOSIS — Z95.5: ICD-10-CM

## 2019-07-20 DIAGNOSIS — R29.700: ICD-10-CM

## 2019-07-20 DIAGNOSIS — Z79.82: ICD-10-CM

## 2019-07-20 DIAGNOSIS — Z95.0: ICD-10-CM

## 2019-07-20 DIAGNOSIS — Z87.891: ICD-10-CM

## 2019-07-20 DIAGNOSIS — I25.119: ICD-10-CM

## 2019-07-20 DIAGNOSIS — I10: ICD-10-CM

## 2019-07-20 DIAGNOSIS — I25.2: ICD-10-CM

## 2019-07-20 DIAGNOSIS — Z88.1: ICD-10-CM

## 2019-07-20 DIAGNOSIS — I48.0: ICD-10-CM

## 2019-07-20 DIAGNOSIS — G45.9: Primary | ICD-10-CM

## 2019-07-20 DIAGNOSIS — Z79.01: ICD-10-CM

## 2019-07-20 DIAGNOSIS — Z79.899: ICD-10-CM

## 2019-07-20 DIAGNOSIS — Z88.5: ICD-10-CM

## 2019-07-20 LAB
ALBUMIN SERPL-MCNC: 3.8 G/DL (ref 3.5–5)
ALP SERPL-CCNC: 67 U/L (ref 38–126)
ALT SERPL-CCNC: 21 U/L (ref 9–52)
ANION GAP SERPL CALC-SCNC: 6 MMOL/L
APTT BLD: 38.1 SEC (ref 22–30)
AST SERPL-CCNC: 34 U/L (ref 14–36)
BASOPHILS # BLD AUTO: 0.1 K/UL (ref 0–0.2)
BASOPHILS NFR BLD AUTO: 1 %
BUN SERPL-SCNC: 22 MG/DL (ref 7–17)
CALCIUM SPEC-MCNC: 9.4 MG/DL (ref 8.4–10.2)
CHLORIDE SERPL-SCNC: 105 MMOL/L (ref 98–107)
CK SERPL-CCNC: 36 U/L (ref 30–135)
CO2 SERPL-SCNC: 30 MMOL/L (ref 22–30)
EOSINOPHIL # BLD AUTO: 0.1 K/UL (ref 0–0.7)
EOSINOPHIL NFR BLD AUTO: 2 %
ERYTHROCYTE [DISTWIDTH] IN BLOOD BY AUTOMATED COUNT: 3.92 M/UL (ref 3.8–5.4)
ERYTHROCYTE [DISTWIDTH] IN BLOOD: 15.7 % (ref 11.5–15.5)
GLUCOSE SERPL-MCNC: 94 MG/DL (ref 74–99)
HCT VFR BLD AUTO: 34.6 % (ref 34–46)
HGB BLD-MCNC: 11.2 GM/DL (ref 11.4–16)
INR PPP: 2.8 (ref ?–1.2)
LYMPHOCYTES # SPEC AUTO: 1.2 K/UL (ref 1–4.8)
LYMPHOCYTES NFR SPEC AUTO: 17 %
MAGNESIUM SPEC-SCNC: 2.2 MG/DL (ref 1.6–2.3)
MCH RBC QN AUTO: 28.7 PG (ref 25–35)
MCHC RBC AUTO-ENTMCNC: 32.4 G/DL (ref 31–37)
MCV RBC AUTO: 88.4 FL (ref 80–100)
MONOCYTES # BLD AUTO: 0.4 K/UL (ref 0–1)
MONOCYTES NFR BLD AUTO: 6 %
NEUTROPHILS # BLD AUTO: 4.9 K/UL (ref 1.3–7.7)
NEUTROPHILS NFR BLD AUTO: 72 %
PLATELET # BLD AUTO: 222 K/UL (ref 150–450)
POTASSIUM SERPL-SCNC: 4 MMOL/L (ref 3.5–5.1)
PROT SERPL-MCNC: 6.7 G/DL (ref 6.3–8.2)
PT BLD: 27.3 SEC (ref 9–12)
SODIUM SERPL-SCNC: 141 MMOL/L (ref 137–145)
WBC # BLD AUTO: 6.7 K/UL (ref 3.8–10.6)

## 2019-07-20 PROCEDURE — 83735 ASSAY OF MAGNESIUM: CPT

## 2019-07-20 PROCEDURE — 99285 EMERGENCY DEPT VISIT HI MDM: CPT

## 2019-07-20 PROCEDURE — 84484 ASSAY OF TROPONIN QUANT: CPT

## 2019-07-20 PROCEDURE — 36415 COLL VENOUS BLD VENIPUNCTURE: CPT

## 2019-07-20 PROCEDURE — 85730 THROMBOPLASTIN TIME PARTIAL: CPT

## 2019-07-20 PROCEDURE — 80053 COMPREHEN METABOLIC PANEL: CPT

## 2019-07-20 PROCEDURE — 85610 PROTHROMBIN TIME: CPT

## 2019-07-20 PROCEDURE — 71046 X-RAY EXAM CHEST 2 VIEWS: CPT

## 2019-07-20 PROCEDURE — 70450 CT HEAD/BRAIN W/O DYE: CPT

## 2019-07-20 PROCEDURE — 82550 ASSAY OF CK (CPK): CPT

## 2019-07-20 PROCEDURE — 85025 COMPLETE CBC W/AUTO DIFF WBC: CPT

## 2019-07-20 NOTE — ED
Neuro HPI





- General


Chief Complaint: Neuro Symptoms/Deficit


Stated Complaint: Headache, unable to speak


Time Seen by Provider: 07/20/19 12:59


Source: patient, RN notes reviewed


Mode of arrival: ambulatory


Limitations: no limitations





- History of Present Illness


Is the patient presenting with stroke symptoms?: No


Initial Comments: 





This 85-year-old female with a history of A. fib who is on Coumadin who states 

she's been having intermittent episodes a headache for last 2 days on the upper 

part of her head as well as left side.  She states this morning lasting several 

minutes she had difficulty with expressing herself verbally.  She had no focal 

weakness no blurry vision this trouble with choosing words and with some slurred

speech.  This has since resolved.  She has no prior history of this she states. 

Currently she is asymptomatic





- Related Data


Home Medications: 


                                Home Medications











 Medication  Instructions  Recorded  Confirmed


 


Warfarin [Coumadin] 5 mg PO HS 07/26/14 07/20/19


 


Cholecalciferol [Vitamin D3 (25 2,000 unit PO DAILY 06/20/17 07/20/19





Mcg = 1000 Iu)]   


 


Vitamin B Complex 1 cap PO DAILY 06/20/17 07/20/19


 


Vits A,C,E/Lutein/Minerals 1 tab PO DAILY 06/20/17 07/20/19





[Ocuvite with Lutein Tablet]   


 


Aspirin 81 mg PO HS 07/20/19 07/20/19


 


Atorvastatin [Lipitor] 20 mg PO HS 07/20/19 07/20/19


 


Calcium Carbonate/Vitamin D3 1 tab PO HS 07/20/19 07/20/19





[Calcium 600-Vit D3 800 Caplet]   


 


Furosemide [Lasix] 10 mg PO DAILY 07/20/19 07/20/19


 


Losartan [Cozaar] 25 mg PO DAILY 07/20/19 07/20/19


 


Propranolol HCl [Inderal LA] 60 mg PO DAILY 07/20/19 07/20/19


 


Spironolactone [Aldactone] 25 mg PO HS 07/20/19 07/20/19








                                  Previous Rx's











 Medication  Instructions  Recorded


 


Famotidine [Pepcid] 20 mg PO DAILY  tab 06/23/17











Allergies/Adverse Reactions: 


                                    Allergies











Allergy/AdvReac Type Severity Reaction Status Date / Time


 


clindamycin AdvReac Severe Chest Pain Verified 07/20/19 13:24





   Followed  





   by Heart  





   Attack  


 


narcotics AdvReac Severe Can not Uncoded 08/04/18 19:55





   tolerate  














Review of Systems


ROS Statement: 


Those systems with pertinent positive or pertinent negative responses have been 

documented in the HPI.





ROS Other: All systems not noted in ROS Statement are negative.





General Exam





- General Exam Comments


Initial Comments: 





This is a well-developed well-nourished awake alert oriented 3 female


Limitations: no limitations


General appearance: alert, in no apparent distress


Head exam: Present: atraumatic, normocephalic, normal inspection


Eye exam: Present: normal appearance, PERRL, EOMI.  Absent: scleral icterus, 

conjunctival injection, periorbital swelling


ENT exam: Present: normal exam, mucous membranes moist


Neck exam: Present: normal inspection.  Absent: tenderness, meningismus, 

lymphadenopathy


Respiratory exam: Present: normal lung sounds bilaterally.  Absent: respiratory 

distress, wheezes, rales, rhonchi, stridor


Cardiovascular Exam: Present: regular rate, normal rhythm, normal heart sounds. 

 Absent: systolic murmur, diastolic murmur, rubs, gallop, clicks


GI/Abdominal exam: Present: soft, normal bowel sounds.  Absent: distended, 

tenderness, guarding, rebound, rigid


Extremities exam: Present: normal inspection, full ROM, normal capillary refill.

  Absent: tenderness, pedal edema, joint swelling, calf tenderness


Back exam: Present: normal inspection


Neurological exam: Present: alert, oriented X3, CN II-XII intact


Psychiatric exam: Present: normal affect, normal mood


Skin exam: Present: warm, dry, intact, normal color.  Absent: rash





Stroke MDM





- Lab Data


Result diagrams: 


                                 07/20/19 14:30





                                 07/20/19 14:30


                                   Lab Results











  07/20/19 07/20/19 07/20/19 Range/Units





  14:30 14:30 14:30 


 


WBC  6.7    (3.8-10.6)  k/uL


 


RBC  3.92    (3.80-5.40)  m/uL


 


Hgb  11.2 L    (11.4-16.0)  gm/dL


 


Hct  34.6    (34.0-46.0)  %


 


MCV  88.4    (80.0-100.0)  fL


 


MCH  28.7    (25.0-35.0)  pg


 


MCHC  32.4    (31.0-37.0)  g/dL


 


RDW  15.7 H    (11.5-15.5)  %


 


Plt Count  222    (150-450)  k/uL


 


Neutrophils %  72    %


 


Lymphocytes %  17    %


 


Monocytes %  6    %


 


Eosinophils %  2    %


 


Basophils %  1    %


 


Neutrophils #  4.9    (1.3-7.7)  k/uL


 


Lymphocytes #  1.2    (1.0-4.8)  k/uL


 


Monocytes #  0.4    (0-1.0)  k/uL


 


Eosinophils #  0.1    (0-0.7)  k/uL


 


Basophils #  0.1    (0-0.2)  k/uL


 


PT    27.3 H  (9.0-12.0)  sec


 


INR    2.8 H  (<1.2)  


 


APTT    38.1 H  (22.0-30.0)  sec


 


Sodium   141   (137-145)  mmol/L


 


Potassium   4.0   (3.5-5.1)  mmol/L


 


Chloride   105   ()  mmol/L


 


Carbon Dioxide   30   (22-30)  mmol/L


 


Anion Gap   6   mmol/L


 


BUN   22 H   (7-17)  mg/dL


 


Creatinine   1.23 H   (0.52-1.04)  mg/dL


 


Est GFR (CKD-EPI)AfAm   46   (>60 ml/min/1.73 sqM)  


 


Est GFR (CKD-EPI)NonAf   40   (>60 ml/min/1.73 sqM)  


 


Glucose   94   (74-99)  mg/dL


 


Calcium   9.4   (8.4-10.2)  mg/dL


 


Magnesium   2.2   (1.6-2.3)  mg/dL


 


Total Bilirubin   0.7   (0.2-1.3)  mg/dL


 


AST   34   (14-36)  U/L


 


ALT   21   (9-52)  U/L


 


Alkaline Phosphatase   67   ()  U/L


 


Creatine Kinase   36   ()  U/L


 


Troponin I     (0.000-0.034)  ng/mL


 


Total Protein   6.7   (6.3-8.2)  g/dL


 


Albumin   3.8   (3.5-5.0)  g/dL














  07/20/19 Range/Units





  14:30 


 


WBC   (3.8-10.6)  k/uL


 


RBC   (3.80-5.40)  m/uL


 


Hgb   (11.4-16.0)  gm/dL


 


Hct   (34.0-46.0)  %


 


MCV   (80.0-100.0)  fL


 


MCH   (25.0-35.0)  pg


 


MCHC   (31.0-37.0)  g/dL


 


RDW   (11.5-15.5)  %


 


Plt Count   (150-450)  k/uL


 


Neutrophils %   %


 


Lymphocytes %   %


 


Monocytes %   %


 


Eosinophils %   %


 


Basophils %   %


 


Neutrophils #   (1.3-7.7)  k/uL


 


Lymphocytes #   (1.0-4.8)  k/uL


 


Monocytes #   (0-1.0)  k/uL


 


Eosinophils #   (0-0.7)  k/uL


 


Basophils #   (0-0.2)  k/uL


 


PT   (9.0-12.0)  sec


 


INR   (<1.2)  


 


APTT   (22.0-30.0)  sec


 


Sodium   (137-145)  mmol/L


 


Potassium   (3.5-5.1)  mmol/L


 


Chloride   ()  mmol/L


 


Carbon Dioxide   (22-30)  mmol/L


 


Anion Gap   mmol/L


 


BUN   (7-17)  mg/dL


 


Creatinine   (0.52-1.04)  mg/dL


 


Est GFR (CKD-EPI)AfAm   (>60 ml/min/1.73 sqM)  


 


Est GFR (CKD-EPI)NonAf   (>60 ml/min/1.73 sqM)  


 


Glucose   (74-99)  mg/dL


 


Calcium   (8.4-10.2)  mg/dL


 


Magnesium   (1.6-2.3)  mg/dL


 


Total Bilirubin   (0.2-1.3)  mg/dL


 


AST   (14-36)  U/L


 


ALT   (9-52)  U/L


 


Alkaline Phosphatase   ()  U/L


 


Creatine Kinase   ()  U/L


 


Troponin I  <0.012  (0.000-0.034)  ng/mL


 


Total Protein   (6.3-8.2)  g/dL


 


Albumin   (3.5-5.0)  g/dL














- NIH Stroke Scale


1a. Level of Consciousness: (0) alert


1b. LOC Questions: (0) answers correctly


1c. LOC Commands: (0) performs tasks correctly


2. Best Gaze: (0) normal


3. Visual: (0) no visual loss


4. Facial Palsy: (0) normal symmetrical movement


5a. Motor Arm Left: (0) no drift


5b. Motor Arm Right: (0) no drift


6a. Motor Leg Left: (0) no drift


6b. Motor Leg Right: (0) no drift


7. Limb Ataxia: (0) absent


8. Sensory: (0) normal


9. Best Language: (0) no aphasia


10. Dysarthria: (0) normal


11. Extinction/Inattention: (0) no abnormality





- Thrombolytic Inclusion/Exclusion


Thrombolytic Contraindications: Rapidly Improving s/s





- Medical Decision Making





Patient has had no further symptoms while in the emergency department however 

the presentation is consistent with a TIA.  She further stated that she was 

having similar symptoms although not quite so severe in the recent past.  After 

long discussion admission would be warranted here for evaluation no neurology 

coverage is available at this time patient be transferred to [facility].  I

 did discuss with Dr. Bateman was agreed to set the patient transfer.





- Radiology Data


Radiology results: report reviewed, image reviewed (No acute findings)





- EKG Data


-: EKG Interpreted by Me (The main pacemaker rate was 1:15 QRS 72 QT since QTC 

342/473)





Past Medical History


Past Medical History: Coronary Artery Disease (CAD), Chest Pain / Angina, COPD, 

Eye Disorder, Hypertension, Myocardial Infarction (MI), Mitral Valve Prolapse 

(MVP), Osteoarthritis (OA)


Additional Past Medical History / Comment(s): MITRAL VALVE PROLAPSE, MAC 

DEGERATION, PAST PAROXYSMAL AFIB, CHRONIC BRONCHITIS, PNE X3, STRESS TEST, 

SHINGLE X2 IN 1975, HIATAL HERNIA, INCONT OF URINE WEARS A BRIEF.


Last Myocardial Infarction Date:: UNK


History of Any Multi-Drug Resistant Organisms: None Reported


Past Surgical History: Adenoidectomy, Appendectomy, Cholecystectomy, Heart 

Catheterization, Pacemaker, Tonsillectomy


Additional Past Surgical History / Comment(s): CARDIAC ABLATION, RT KNEE 

ARTHROSCOPY, OVARIES REMOVED 1996,LT PINKY FINGER SX, ROBERT BUNIONECTOMY/HAMMER T

OE SX, ROBERT CATARACTS, X3 ABD HERNIA SX.


Past Anesthesia/Blood Transfusion Reactions: Previous Problems w/ Anesthesia


Additional Past Anesthesia/Blood Transfusion Reaction / Comment(s): DIFFICULTY 

WAKING UP AFTER RT KNEE SX.   PAST BLOOD TRANSFUSION-NO REACTION


Type of Cardiac Device: Permanent Pacemaker


Device Placement Date:: 2009


Past Psychological History: No Psychological Hx Reported


Smoking Status: Former smoker


Past Alcohol Use History: Occasional


Past Drug Use History: None Reported





Course


                                   Vital Signs











  07/20/19





  12:53


 


Temperature 98.2 F


 


Pulse Rate 80


 


Respiratory 18





Rate 


 


Blood Pressure 126/74


 


O2 Sat by Pulse 95





Oximetry 














Disposition


Clinical Impression: 


 Transient cerebral ischemia





Disposition: OTHER INSTITUTION NOT DEFINED


Condition: Stable


Referrals: 


Car Rees DO [Primary Care Provider] - 1-2 days





- Out of Hospital Transfer - Req. Specs


Out of Hospital Transfer - Requested Specifics: Other Emergency Center

## 2019-07-20 NOTE — XR
EXAMINATION TYPE: XR chest 2V

 

DATE OF EXAM: 7/20/2019

 

HISTORY: altered mental status.

 

REFERENCE: Previous study dated 8/23/2015.

 

FINDINGS: There is a bipolar pacemaker place on the left. Heart size upper limits of normal. There is
 a levoscoliosis involving the thoracolumbar junction. The lungs are clear. Pleural spaces are clear.


 

IMPRESSION: 

 

NO ACUTE INTRATHORACIC ABNORMALITY.

## 2019-08-07 ENCOUNTER — HOSPITAL ENCOUNTER (EMERGENCY)
Dept: HOSPITAL 47 - EC | Age: 84
Discharge: HOME | End: 2019-08-07
Payer: MEDICARE

## 2019-08-07 VITALS — TEMPERATURE: 97.9 F | HEART RATE: 78 BPM | RESPIRATION RATE: 18 BRPM

## 2019-08-07 VITALS — SYSTOLIC BLOOD PRESSURE: 107 MMHG | DIASTOLIC BLOOD PRESSURE: 50 MMHG

## 2019-08-07 DIAGNOSIS — R11.2: Primary | ICD-10-CM

## 2019-08-07 DIAGNOSIS — I25.2: ICD-10-CM

## 2019-08-07 DIAGNOSIS — M19.90: ICD-10-CM

## 2019-08-07 DIAGNOSIS — Z87.891: ICD-10-CM

## 2019-08-07 DIAGNOSIS — Z88.1: ICD-10-CM

## 2019-08-07 DIAGNOSIS — D72.829: ICD-10-CM

## 2019-08-07 DIAGNOSIS — Z95.0: ICD-10-CM

## 2019-08-07 DIAGNOSIS — I48.0: ICD-10-CM

## 2019-08-07 DIAGNOSIS — Z79.82: ICD-10-CM

## 2019-08-07 DIAGNOSIS — I25.119: ICD-10-CM

## 2019-08-07 DIAGNOSIS — Z90.89: ICD-10-CM

## 2019-08-07 DIAGNOSIS — Z90.49: ICD-10-CM

## 2019-08-07 DIAGNOSIS — Z95.5: ICD-10-CM

## 2019-08-07 DIAGNOSIS — Z79.899: ICD-10-CM

## 2019-08-07 DIAGNOSIS — Z86.79: ICD-10-CM

## 2019-08-07 DIAGNOSIS — R19.7: ICD-10-CM

## 2019-08-07 DIAGNOSIS — I10: ICD-10-CM

## 2019-08-07 DIAGNOSIS — Z79.01: ICD-10-CM

## 2019-08-07 DIAGNOSIS — Z88.5: ICD-10-CM

## 2019-08-07 LAB
ANION GAP SERPL CALC-SCNC: 8 MMOL/L
BASOPHILS # BLD AUTO: 0 K/UL (ref 0–0.2)
BASOPHILS NFR BLD AUTO: 0 %
BUN SERPL-SCNC: 31 MG/DL (ref 7–17)
CALCIUM SPEC-MCNC: 8.7 MG/DL (ref 8.4–10.2)
CHLORIDE SERPL-SCNC: 106 MMOL/L (ref 98–107)
CO2 SERPL-SCNC: 22 MMOL/L (ref 22–30)
EOSINOPHIL # BLD AUTO: 0.2 K/UL (ref 0–0.7)
EOSINOPHIL NFR BLD AUTO: 2 %
ERYTHROCYTE [DISTWIDTH] IN BLOOD BY AUTOMATED COUNT: 3.86 M/UL (ref 3.8–5.4)
ERYTHROCYTE [DISTWIDTH] IN BLOOD: 15.3 % (ref 11.5–15.5)
GLUCOSE SERPL-MCNC: 125 MG/DL (ref 74–99)
HCT VFR BLD AUTO: 35.2 % (ref 34–46)
HGB BLD-MCNC: 11.4 GM/DL (ref 11.4–16)
LYMPHOCYTES # SPEC AUTO: 0.4 K/UL (ref 1–4.8)
LYMPHOCYTES NFR SPEC AUTO: 4 %
MCH RBC QN AUTO: 29.5 PG (ref 25–35)
MCHC RBC AUTO-ENTMCNC: 32.3 G/DL (ref 31–37)
MCV RBC AUTO: 91.2 FL (ref 80–100)
MONOCYTES # BLD AUTO: 0.3 K/UL (ref 0–1)
MONOCYTES NFR BLD AUTO: 3 %
NEUTROPHILS # BLD AUTO: 9.7 K/UL (ref 1.3–7.7)
NEUTROPHILS NFR BLD AUTO: 91 %
PLATELET # BLD AUTO: 263 K/UL (ref 150–450)
POTASSIUM SERPL-SCNC: 4.2 MMOL/L (ref 3.5–5.1)
SODIUM SERPL-SCNC: 136 MMOL/L (ref 137–145)
WBC # BLD AUTO: 10.7 K/UL (ref 3.8–10.6)

## 2019-08-07 PROCEDURE — 99284 EMERGENCY DEPT VISIT MOD MDM: CPT

## 2019-08-07 PROCEDURE — 80048 BASIC METABOLIC PNL TOTAL CA: CPT

## 2019-08-07 PROCEDURE — 36415 COLL VENOUS BLD VENIPUNCTURE: CPT

## 2019-08-07 PROCEDURE — 85025 COMPLETE CBC W/AUTO DIFF WBC: CPT

## 2019-08-07 PROCEDURE — 83690 ASSAY OF LIPASE: CPT

## 2019-08-07 PROCEDURE — 96374 THER/PROPH/DIAG INJ IV PUSH: CPT

## 2019-08-07 PROCEDURE — 96361 HYDRATE IV INFUSION ADD-ON: CPT

## 2019-08-07 NOTE — ED
General Adult HPI





- General


Stated complaint: Nausea,Vomiting


Time Seen by Provider: 08/07/19 02:28


Source: patient, family, RN notes reviewed


Limitations: no limitations





- History of Present Illness


Initial comments: 





85-year-old female with a past medical history of coronary artery disease, 

hypertension, MI presents to the emergency department for a chief complaint of 

nausea vomiting diarrhea.  Patient states that she took her medication later in 

the night than normal and after it started have some nausea and vomiting.  

Patient states this persisted and she is now having brown watery diarrhea.  St

ates she has only had diarrhea for about 2 hours.  Patient denies any abdominal 

pain.  Denies any fevers or chills.  Patient has no other complaints at this 

time including shortness of breath, chest pain, abdominal pain, nausea or 

vomiting, headache, or visual changes.





- Related Data


                                Home Medications











 Medication  Instructions  Recorded  Confirmed


 


Warfarin [Coumadin] 5 mg PO HS 07/26/14 07/20/19


 


Cholecalciferol [Vitamin D3 (25 2,000 unit PO DAILY 06/20/17 07/20/19





Mcg = 1000 Iu)]   


 


Vitamin B Complex 1 cap PO DAILY 06/20/17 07/20/19


 


Vits A,C,E/Lutein/Minerals 1 tab PO DAILY 06/20/17 07/20/19





[Ocuvite with Lutein Tablet]   


 


Aspirin 81 mg PO HS 07/20/19 07/20/19


 


Atorvastatin [Lipitor] 20 mg PO HS 07/20/19 07/20/19


 


Calcium Carbonate/Vitamin D3 1 tab PO HS 07/20/19 07/20/19





[Calcium 600-Vit D3 800 Caplet]   


 


Furosemide [Lasix] 10 mg PO DAILY 07/20/19 07/20/19


 


Losartan [Cozaar] 25 mg PO DAILY 07/20/19 07/20/19


 


Propranolol HCl [Inderal LA] 60 mg PO DAILY 07/20/19 07/20/19


 


Spironolactone [Aldactone] 25 mg PO HS 07/20/19 07/20/19








                                  Previous Rx's











 Medication  Instructions  Recorded


 


Famotidine [Pepcid] 20 mg PO DAILY  tab 06/23/17


 


Loperamide [Imodium] 2 mg PO QID #12 capsule 08/07/19











                                    Allergies











Allergy/AdvReac Type Severity Reaction Status Date / Time


 


clindamycin AdvReac Severe Chest Pain Verified 08/07/19 03:00





   Followed  





   by Heart  





   Attack  


 


narcotics AdvReac Severe Can not Uncoded 08/04/18 19:55





   tolerate  














Review of Systems


ROS Statement: 


Those systems with pertinent positive or pertinent negative responses have been 

documented in the HPI.





ROS Other: All systems not noted in ROS Statement are negative.





Past Medical History


Past Medical History: Coronary Artery Disease (CAD), Chest Pain / Angina, COPD, 

Eye Disorder, Hypertension, Myocardial Infarction (MI), Mitral Valve Prolapse 

(MVP), Osteoarthritis (OA)


Additional Past Medical History / Comment(s): MITRAL VALVE PROLAPSE, MAC 

DEGERATION, PAST PAROXYSMAL AFIB, CHRONIC BRONCHITIS, PNE X3, STRESS TEST, 

SHINGLE X2 IN 1975, HIATAL HERNIA, INCONT OF URINE WEARS A BRIEF.


Last Myocardial Infarction Date:: UNK


History of Any Multi-Drug Resistant Organisms: None Reported


Past Surgical History: Adenoidectomy, Appendectomy, Cholecystectomy, Heart 

Catheterization, Pacemaker, Tonsillectomy


Additional Past Surgical History / Comment(s): CARDIAC ABLATION, RT KNEE 

ARTHROSCOPY, OVARIES REMOVED 1996,LT PINKY FINGER SX, ROBERT BUNIONECTOMY/HAMMER 

TOE SX, ROBERT CATARACTS, X3 ABD HERNIA SX.


Past Anesthesia/Blood Transfusion Reactions: Previous Problems w/ Anesthesia


Additional Past Anesthesia/Blood Transfusion Reaction / Comment(s): DIFFICULTY 

WAKING UP AFTER RT KNEE SX.   PAST BLOOD TRANSFUSION-NO REACTION


Type of Cardiac Device: Permanent Pacemaker


Device Placement Date:: 2009


Past Psychological History: No Psychological Hx Reported


Smoking Status: Former smoker


Past Alcohol Use History: Occasional


Past Drug Use History: None Reported





General Exam


General appearance: alert, in no apparent distress


Head exam: Present: atraumatic, normocephalic, normal inspection


Eye exam: Present: normal appearance, PERRL, EOMI.  Absent: scleral icterus, 

conjunctival injection, periorbital swelling


ENT exam: Present: normal exam, mucous membranes moist


Neck exam: Present: normal inspection, full ROM.  Absent: tenderness, mening

ismus, lymphadenopathy


Respiratory exam: Present: normal lung sounds bilaterally.  Absent: respiratory 

distress, wheezes, rales, rhonchi, stridor


Cardiovascular Exam: Present: regular rate, normal rhythm, normal heart sounds. 

 Absent: systolic murmur, diastolic murmur, rubs, gallop, clicks


GI/Abdominal exam: Present: soft, normal bowel sounds.  Absent: distended, 

tenderness (No tenderness in the abdomen), guarding, rebound, rigid


Neurological exam: Present: alert, oriented X3, CN II-XII intact


Psychiatric exam: Present: normal affect, normal mood





Course


                                   Vital Signs











  08/07/19





  02:56


 


Temperature 97.9 F


 


Pulse Rate 78


 


Respiratory 18





Rate 


 


Blood Pressure 108/64


 


O2 Sat by Pulse 96





Oximetry 














Medical Decision Making





- Medical Decision Making





85-year-old female presents to the emergency department for nausea vomiting and 

diarrhea.  Patient took her medication later in the night than she usually does 

he started to have some nausea and vomiting about 4 hours ago.  Patient had a 

few episodes of diarrhea about 2 hours ago.  No abdominal pain.  No fevers or 

chills.  Patient did have 1 episode of vomiting while in the emergency 

department.  On exam she has no abdominal tenderness whatsoever.  CBC shows a 

mild leukocytosis likely secondary to vomiting.  CMP unremarkable.  BUN to 

creatinine ratio of 23 noted, likely mild dehydration, patient given fluids.  

Lipase is normal.  Patient reevaluated, resting comfortably, actually sleeping. 

 Patient was able to tolerate oral challenge test prior to this.  Patient will 

be discharged home with Zofran and loperamide.  He will return here if she has 

any worsening symptoms.





- Lab Data


Result diagrams: 


                                 08/07/19 03:20





                                 08/07/19 03:20


                                   Lab Results











  08/07/19 08/07/19 Range/Units





  03:20 03:20 


 


WBC   10.7 H  (3.8-10.6)  k/uL


 


RBC   3.86  (3.80-5.40)  m/uL


 


Hgb   11.4  (11.4-16.0)  gm/dL


 


Hct   35.2  (34.0-46.0)  %


 


MCV   91.2  (80.0-100.0)  fL


 


MCH   29.5  (25.0-35.0)  pg


 


MCHC   32.3  (31.0-37.0)  g/dL


 


RDW   15.3  (11.5-15.5)  %


 


Plt Count   263  (150-450)  k/uL


 


Neutrophils %   91  %


 


Lymphocytes %   4  %


 


Monocytes %   3  %


 


Eosinophils %   2  %


 


Basophils %   0  %


 


Neutrophils #   9.7 H  (1.3-7.7)  k/uL


 


Lymphocytes #   0.4 L  (1.0-4.8)  k/uL


 


Monocytes #   0.3  (0-1.0)  k/uL


 


Eosinophils #   0.2  (0-0.7)  k/uL


 


Basophils #   0.0  (0-0.2)  k/uL


 


Manual Slide Review   Performed  


 


Sodium  136 L   (137-145)  mmol/L


 


Potassium  4.2   (3.5-5.1)  mmol/L


 


Chloride  106   ()  mmol/L


 


Carbon Dioxide  22   (22-30)  mmol/L


 


Anion Gap  8   mmol/L


 


BUN  31 H   (7-17)  mg/dL


 


Creatinine  1.31 H   (0.52-1.04)  mg/dL


 


Est GFR (CKD-EPI)AfAm  43   (>60 ml/min/1.73 sqM)  


 


Est GFR (CKD-EPI)NonAf  37   (>60 ml/min/1.73 sqM)  


 


Glucose  125 H   (74-99)  mg/dL


 


Calcium  8.7   (8.4-10.2)  mg/dL


 


Lipase  146   ()  U/L














Disposition


Clinical Impression: 


 Nausea, vomiting and diarrhea





Disposition: HOME SELF-CARE


Condition: Good


Instructions (If sedation given, give patient instructions):  Acute Nausea and 

Vomiting (ED), Acute Diarrhea (ED)


Additional Instructions: 


Please take Zofran and loperamide as needed.  Please follow-up with primary care

 in 1-2 days.  If you develop any abdominal pain or fevers return to the 

emergency department.


Prescriptions: 


Loperamide [Imodium] 2 mg PO QID #12 capsule


Is patient prescribed a controlled substance at d/c from ED?: No


Referrals: 


Car Rees DO [Primary Care Provider] - 1-2 days


Time of Disposition: 04:26

## 2019-10-18 ENCOUNTER — HOSPITAL ENCOUNTER (EMERGENCY)
Dept: HOSPITAL 47 - EC | Age: 84
Discharge: HOME | End: 2019-10-18
Payer: MEDICARE

## 2019-10-18 VITALS — DIASTOLIC BLOOD PRESSURE: 63 MMHG | TEMPERATURE: 99.3 F | SYSTOLIC BLOOD PRESSURE: 136 MMHG | RESPIRATION RATE: 16 BRPM

## 2019-10-18 VITALS — HEART RATE: 60 BPM

## 2019-10-18 DIAGNOSIS — I25.119: ICD-10-CM

## 2019-10-18 DIAGNOSIS — Z79.82: ICD-10-CM

## 2019-10-18 DIAGNOSIS — Z79.899: ICD-10-CM

## 2019-10-18 DIAGNOSIS — I25.2: ICD-10-CM

## 2019-10-18 DIAGNOSIS — Z88.5: ICD-10-CM

## 2019-10-18 DIAGNOSIS — I10: ICD-10-CM
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
## 2019-10-18 DIAGNOSIS — Z95.0: ICD-10-CM

## 2019-10-18 DIAGNOSIS — Z86.73: ICD-10-CM

## 2019-10-18 DIAGNOSIS — M25.571: ICD-10-CM

## 2019-10-18 DIAGNOSIS — Z79.01: ICD-10-CM

## 2019-10-18 DIAGNOSIS — M25.511: Primary | ICD-10-CM

## 2019-10-18 DIAGNOSIS — Z88.1: ICD-10-CM

## 2019-10-18 DIAGNOSIS — I48.0: ICD-10-CM

## 2019-10-18 LAB
ALBUMIN SERPL-MCNC: 4 G/DL (ref 3.5–5)
ALP SERPL-CCNC: 85 U/L (ref 38–126)
ALT SERPL-CCNC: 16 U/L (ref 9–52)
AMYLASE SERPL-CCNC: 55 U/L (ref 30–110)
ANION GAP SERPL CALC-SCNC: 9 MMOL/L
APTT BLD: 25.5 SEC (ref 22–30)
AST SERPL-CCNC: 30 U/L (ref 14–36)
BASOPHILS # BLD AUTO: 0.1 K/UL (ref 0–0.2)
BASOPHILS NFR BLD AUTO: 1 %
BUN SERPL-SCNC: 32 MG/DL (ref 7–17)
CALCIUM SPEC-MCNC: 9.6 MG/DL (ref 8.4–10.2)
CHLORIDE SERPL-SCNC: 105 MMOL/L (ref 98–107)
CK SERPL-CCNC: 68 U/L (ref 30–135)
CO2 SERPL-SCNC: 28 MMOL/L (ref 22–30)
EOSINOPHIL # BLD AUTO: 0.5 K/UL (ref 0–0.7)
EOSINOPHIL NFR BLD AUTO: 6 %
ERYTHROCYTE [DISTWIDTH] IN BLOOD BY AUTOMATED COUNT: 3.95 M/UL (ref 3.8–5.4)
ERYTHROCYTE [DISTWIDTH] IN BLOOD: 13.3 % (ref 11.5–15.5)
GLUCOSE SERPL-MCNC: 121 MG/DL (ref 74–99)
HCT VFR BLD AUTO: 36.6 % (ref 34–46)
HGB BLD-MCNC: 11.5 GM/DL (ref 11.4–16)
INR PPP: 0.9 (ref ?–1.2)
LYMPHOCYTES # SPEC AUTO: 1 K/UL (ref 1–4.8)
LYMPHOCYTES NFR SPEC AUTO: 13 %
MCH RBC QN AUTO: 29 PG (ref 25–35)
MCHC RBC AUTO-ENTMCNC: 31.3 G/DL (ref 31–37)
MCV RBC AUTO: 92.7 FL (ref 80–100)
MONOCYTES # BLD AUTO: 0.4 K/UL (ref 0–1)
MONOCYTES NFR BLD AUTO: 5 %
NEUTROPHILS # BLD AUTO: 5.5 K/UL (ref 1.3–7.7)
NEUTROPHILS NFR BLD AUTO: 73 %
PLATELET # BLD AUTO: 272 K/UL (ref 150–450)
POTASSIUM SERPL-SCNC: 3.8 MMOL/L (ref 3.5–5.1)
PROT SERPL-MCNC: 7.2 G/DL (ref 6.3–8.2)
PT BLD: 10.1 SEC (ref 9–12)
SODIUM SERPL-SCNC: 142 MMOL/L (ref 137–145)
TROPONIN I SERPL-MCNC: <0.012 NG/ML (ref 0–0.03)
WBC # BLD AUTO: 7.5 K/UL (ref 3.8–10.6)

## 2019-10-18 PROCEDURE — 80320 DRUG SCREEN QUANTALCOHOLS: CPT

## 2019-10-18 PROCEDURE — 80053 COMPREHEN METABOLIC PANEL: CPT

## 2019-10-18 PROCEDURE — 85730 THROMBOPLASTIN TIME PARTIAL: CPT

## 2019-10-18 PROCEDURE — 73610 X-RAY EXAM OF ANKLE: CPT

## 2019-10-18 PROCEDURE — 72125 CT NECK SPINE W/O DYE: CPT

## 2019-10-18 PROCEDURE — 86850 RBC ANTIBODY SCREEN: CPT

## 2019-10-18 PROCEDURE — 70450 CT HEAD/BRAIN W/O DYE: CPT

## 2019-10-18 PROCEDURE — 82550 ASSAY OF CK (CPK): CPT

## 2019-10-18 PROCEDURE — 82150 ASSAY OF AMYLASE: CPT

## 2019-10-18 PROCEDURE — 83690 ASSAY OF LIPASE: CPT

## 2019-10-18 PROCEDURE — 84484 ASSAY OF TROPONIN QUANT: CPT

## 2019-10-18 PROCEDURE — 82553 CREATINE MB FRACTION: CPT

## 2019-10-18 PROCEDURE — 85025 COMPLETE CBC W/AUTO DIFF WBC: CPT

## 2019-10-18 PROCEDURE — 72170 X-RAY EXAM OF PELVIS: CPT

## 2019-10-18 PROCEDURE — 86900 BLOOD TYPING SEROLOGIC ABO: CPT

## 2019-10-18 PROCEDURE — 99284 EMERGENCY DEPT VISIT MOD MDM: CPT

## 2019-10-18 PROCEDURE — 73030 X-RAY EXAM OF SHOULDER: CPT

## 2019-10-18 PROCEDURE — 86901 BLOOD TYPING SEROLOGIC RH(D): CPT

## 2019-10-18 PROCEDURE — 71045 X-RAY EXAM CHEST 1 VIEW: CPT

## 2019-10-18 PROCEDURE — 36415 COLL VENOUS BLD VENIPUNCTURE: CPT

## 2019-10-18 PROCEDURE — 85610 PROTHROMBIN TIME: CPT

## 2019-10-18 PROCEDURE — 93005 ELECTROCARDIOGRAM TRACING: CPT

## 2019-10-18 NOTE — XR
EXAMINATION TYPE: XR chest 1V portable

 

DATE OF EXAM: 10/18/2019

 

COMPARISON: 7/20/2019

 

HISTORY: Fall. Pain.

 

TECHNIQUE: Single frontal view of the chest is obtained.

 

FINDINGS:  There is no heart failure nor confluent pneumonic infiltrate. There is a left axillary pac
emaker. Costophrenic angles are clear.

 

IMPRESSION:  No active cardiopulmonary disease. No change.

## 2019-10-18 NOTE — XR
EXAMINATION TYPE: XR ankle complete RT

 

DATE OF EXAM: 10/18/2019

 

COMPARISON: NONE

 

HISTORY: Fall. Pain.

 

TECHNIQUE: 3 views

 

FINDINGS: There is soft tissue swelling around the ankle joint. Ankle mortise is anatomic. There is p
lantar calcaneal spurring.

 

IMPRESSION: Extensive subcutaneous edema. No fracture seen.

## 2019-10-18 NOTE — CT
EXAMINATION TYPE: CT brain suzy bernard con

 

DATE OF EXAM: 10/18/2019

 

COMPARISON: CT brain 7/20/2019

 

HISTORY: fall

Headache. Neck pain

CT DLP: 1271.7 mGycm

Automated exposure control for dose reduction was used.

 

TECHNIQUE: CT scan of the head and cervical spine are performed without contrast.

 

FINDINGS:   There is mild cerebral cortical atrophy. There is no mass effect nor midline shift. There
 is no sign of intracranial hemorrhage. The calvarium is intact.

 

Cervical vertebra have normal alignment. There is degenerative disc space narrowing at C5-6 and C6-7 
with spurring of the endplates. There is multilevel mild hypertrophic cervical facet arthropathy. The
 skull base is intact. I see no bony destructive process.

 

IMPRESSION:

Spondylotic changes in the lower cervical spine. No fracture.

 

Mild cerebral atrophy. No acute intracranial abnormality. Brain unchanged compared to old exam.

## 2019-10-18 NOTE — XR
EXAMINATION TYPE: XR shoulder complete RT

 

DATE OF EXAM: 10/18/2019

 

COMPARISON: NONE

 

HISTORY: Fall. Pain.

 

TECHNIQUE: 3 views

 

FINDINGS: There is spurring at the AC joint. There is some spurring at the glenohumeral joint. There 
is narrowing of the subacromial joint space. I see no fracture.

 

IMPRESSION: Osteoarthritis and subacromial joint space narrowing and impingement. No fracture seen.

## 2019-10-18 NOTE — ED
Fall HPI





- General


Chief Complaint: Fall


Stated Complaint: Fell off porch/shoulder injury


Time Seen by Provider: 10/18/19 18:20


Source: patient, family


Mode of arrival: wheelchair





- History of Present Illness


Initial Comments: 


85-year-old female with history of atrial fibrillation, COPD, previous CVA/TIA, 

coronary artery disease, hypertension presents emergency department for chief 

complaint of fall.  Patient on eliqiuis.  Denies loss of consciousness.  Patient

states she got caught on the screen door when exiting a doorway onto a porch 

which closed on her causing her to lose balance and fall over onto her right 

side off the porch approximately 3 steps in height.  Patient states she mostly 

to the fall with her right shoulder.  Patient states she did hit the side of her

head.  Patient states she also noted some discomfort in the right lateral ankle.

 Patient states that she has no headache.  She denies any severe neck pain back 

pain.  Patient has pain in the hips or knees.  Patient states she has pain only 

in the right ankle denies pain of the left ankle.  Patient denies any speech 

changes nausea vomiting dizziness diplopia or vision loss she denies any 

weakness of the upper or lower extremities or sensation deficits denies 

paresthesias.  Patient states she limited range of motion secondary to pain of 

the right shoulder.  Remaining review of system negative.  Patient states she 

was evaluated by EMS at the house refused transport stating she did not think it

was necessary and came to the emergency department for evaluation of right 

shoulder injury.








- Related Data


                                Home Medications











 Medication  Instructions  Recorded  Confirmed


 


Cholecalciferol [Vitamin D3 (25 2,000 unit PO DAILY 06/20/17 10/18/19





Mcg = 1000 Iu)]   


 


Vitamin B Complex 1 cap PO DAILY 06/20/17 10/18/19


 


Vits A,C,E/Lutein/Minerals 1 tab PO DAILY 06/20/17 10/18/19





[Ocuvite with Lutein Tablet]   


 


Aspirin 81 mg PO HS 07/20/19 10/18/19


 


Atorvastatin [Lipitor] 20 mg PO HS 07/20/19 10/18/19


 


Calcium Carbonate/Vitamin D3 1 tab PO HS 07/20/19 10/18/19





[Calcium 600-Vit D3 800 Caplet]   


 


Furosemide [Lasix] 10 mg PO DAILY 07/20/19 10/18/19


 


Losartan [Cozaar] 25 mg PO DAILY 07/20/19 10/18/19


 


Propranolol HCl [Inderal LA] 60 mg PO DAILY 07/20/19 10/18/19


 


Spironolactone [Aldactone] 12.5 mg PO HS 07/20/19 10/18/19


 


Apixaban [Eliquis] 5 mg PO BID 10/18/19 10/18/19








                                  Previous Rx's











 Medication  Instructions  Recorded


 


Famotidine [Pepcid] 20 mg PO DAILY  tab 06/23/17











                                    Allergies











Allergy/AdvReac Type Severity Reaction Status Date / Time


 


clindamycin AdvReac Severe Chest Pain Verified 10/18/19 19:40





   Followed  





   by Heart  





   Attack  


 


narcotics AdvReac Severe Can not Uncoded 10/18/19 18:13





   tolerate  














Review of Systems


ROS Statement: 


Those systems with pertinent positive or pertinent negative responses have been 

documented in the HPI.





ROS Other: All systems not noted in ROS Statement are negative.





Past Medical History


Past Medical History: Coronary Artery Disease (CAD), Chest Pain / Angina, COPD, 

CVA/TIA, Eye Disorder, Hypertension, Myocardial Infarction (MI), Mitral Valve 

Prolapse (MVP), Osteoarthritis (OA)


Additional Past Medical History / Comment(s): MITRAL VALVE PROLAPSE, MAC 

DEGERATION, PAST PAROXYSMAL AFIB, CHRONIC BRONCHITIS, PNE X3, STRESS TEST, 

SHINGLE X2 IN 1975, HIATAL HERNIA, INCONT OF URINE WEARS A BRIEF.


Last Myocardial Infarction Date:: UNK


History of Any Multi-Drug Resistant Organisms: None Reported


Past Surgical History: Adenoidectomy, Appendectomy, Cholecystectomy, Heart 

Catheterization, Pacemaker, Tonsillectomy


Additional Past Surgical History / Comment(s): CARDIAC ABLATION, RT KNEE 

ARTHROSCOPY, OVARIES REMOVED 1996,LT PINKY FINGER SX, ROBERT BUNIONECTOMY/HAMMER 

TOE SX, ROBERT CATARACTS, X3 ABD HERNIA SX.


Past Anesthesia/Blood Transfusion Reactions: Previous Problems w/ Anesthesia


Additional Past Anesthesia/Blood Transfusion Reaction / Comment(s): DIFFICULTY 

WAKING UP AFTER RT KNEE SX.   PAST BLOOD TRANSFUSION-NO REACTION


Type of Cardiac Device: Permanent Pacemaker


Device Placement Date:: 2009


Past Psychological History: No Psychological Hx Reported


Smoking Status: Former smoker


Past Alcohol Use History: Occasional


Past Drug Use History: None Reported





General Exam





- General Exam Comments


Initial Comments: 


General:  The patient is awake and alert, in no distress, and does not appear 

acutely ill. 


Eye:  +3mm pupils are equal, round and reactive to light, extra-ocular movements

 are intact.  No nystagmus.  There is normal conjunctiva bilaterally.  No signs 

of icterus.  


Ears, nose, mouth and throat:  There are moist mucous membranes and no oral 

lesions.  No scalp hematomas lacerations abrasions or contusions noted.  No 

crepitus palpation of the scalp no identify area of local injury.  Patient has 

no raccoon or [place] sign.  No midline tenderness palpation of the cervical 

spine upon arrival in the emergency department.  Patient has some mild right-

sided paravertebral tenderness.  Full range of motion the cervical spine without

 difficulty for flexion extension and lateral flexion and rotation.


Neck:  The neck is supple, there is no tenderness or JVD.  


Cardiovascular:  There is a regular rate and rhythm. No murmur, rub or gallop is

 appreciated.


Respiratory:  Lungs are clear to auscultation, respirations are non-labored, 

breath sounds are equal.  No wheezes, stridor, rales, or rhonchi.


Gastrointestinal:  Soft, non-distended, non-tender abdomen without masses or 

organomegaly noted. There is no rebound or guarding present.  


Musculoskeletal:  Patient has bilateral lower extremity edema upon inspection of

 the lower external ears.  No knee swelling and identify no localized swelling 

of the ankles no ecchymosis.  Patient is no ecchymosis of the feet.  No point 

localized tenderness over the feet.  Patient is some mild tenderness to pal

pation of the lateral aspect of the right ankle.  Full range of motion at the 

ankles knees hips bilaterally.  Negative logroll.  Patient is no midline 

tenderness to patient the thoracic or lumbar spine.  Normal ROM, no tenderness. 

 Strength 5/5 of the upper and lower extremities equal and comparison 

bilaterally. Sensation intact. Radial and DP pulses equal bilaterally 2+.  


Neurological:  A&O x 3. CN II-XII intact, There are no obvious motor or sensory 

deficits. Coordination appears grossly intact. Speech is normal.


Skin:  Skin is warm and dry and no rashes or lesions are noted. 


Psychiatric:  Cooperative, appropriate mood & affect, normal judgment.  





Limitations: no limitations





Course


                                   Vital Signs











  10/18/19 10/18/19





  18:13 21:15


 


Temperature 98.3 F 99.3 F


 


Pulse Rate 60 60


 


Respiratory 18 16





Rate  


 


Blood Pressure 116/67 136/63


 


O2 Sat by Pulse 98 99





Oximetry  














Medical Decision Making





- Medical Decision Making


Well-appearing 85-year-old female who is on anticoagulation therapy presenting 

for fall.  No loss of conscious.  CT negative.  C-spine cleared.  No osseous 

injury of the right ankle.  Chronic edema patient states she takes Lasix for.  

No evidence of heart failure on chest x-ray.  Pelvis x-ray negative.  Laboratory

 studies stable.  Patient requesting discharge refuses to provide urinalysis.  

No complaints of back or abdominal pain.  Discussed the case with my attending 

provider Dr. Staples who evaluated patient in the ER, he is agreeable with 

discharge at this time.  On-call trauma surgeon was consulted for priorty 2 

trauma for fall ~3ft on anticoagulation therapy with head injury.





Ventricular rate 64 bpm, QRS restoration 70 ms, QT//410.  Junctional 

rhythm no ST elevation or depression.  EKG was discussed by attending provider








- Lab Data


Result diagrams: 


                                 10/18/19 19:06





                                 10/18/19 19:06


                                   Lab Results











  10/18/19 10/18/19 10/18/19 Range/Units





  19:06 19:06 19:06 


 


WBC  7.5    (3.8-10.6)  k/uL


 


RBC  3.95    (3.80-5.40)  m/uL


 


Hgb  11.5    (11.4-16.0)  gm/dL


 


Hct  36.6    (34.0-46.0)  %


 


MCV  92.7    (80.0-100.0)  fL


 


MCH  29.0    (25.0-35.0)  pg


 


MCHC  31.3    (31.0-37.0)  g/dL


 


RDW  13.3    (11.5-15.5)  %


 


Plt Count  272    (150-450)  k/uL


 


Neutrophils %  73    %


 


Lymphocytes %  13    %


 


Monocytes %  5    %


 


Eosinophils %  6    %


 


Basophils %  1    %


 


Neutrophils #  5.5    (1.3-7.7)  k/uL


 


Lymphocytes #  1.0    (1.0-4.8)  k/uL


 


Monocytes #  0.4    (0-1.0)  k/uL


 


Eosinophils #  0.5    (0-0.7)  k/uL


 


Basophils #  0.1    (0-0.2)  k/uL


 


PT     (9.0-12.0)  sec


 


INR     (<1.2)  


 


APTT     (22.0-30.0)  sec


 


Sodium   142   (137-145)  mmol/L


 


Potassium   3.8   (3.5-5.1)  mmol/L


 


Chloride   105   ()  mmol/L


 


Carbon Dioxide   28   (22-30)  mmol/L


 


Anion Gap   9   mmol/L


 


BUN   32 H   (7-17)  mg/dL


 


Creatinine   1.29 H   (0.52-1.04)  mg/dL


 


Est GFR (CKD-EPI)AfAm   44   (>60 ml/min/1.73 sqM)  


 


Est GFR (CKD-EPI)NonAf   38   (>60 ml/min/1.73 sqM)  


 


Glucose   121 H   (74-99)  mg/dL


 


Calcium   9.6   (8.4-10.2)  mg/dL


 


Total Bilirubin   0.5   (0.2-1.3)  mg/dL


 


AST   30   (14-36)  U/L


 


ALT   16   (9-52)  U/L


 


Alkaline Phosphatase   85   ()  U/L


 


Total Creatine Kinase    68  ()  U/L


 


CK-MB (CK-2)    0.7  (0.0-2.4)  ng/mL


 


CK-MB (CK-2) Rel Index    1.0  


 


Troponin I    <0.012  (0.000-0.034)  ng/mL


 


Total Protein   7.2   (6.3-8.2)  g/dL


 


Albumin   4.0   (3.5-5.0)  g/dL


 


Amylase   55   ()  U/L


 


Lipase   125   ()  U/L


 


Serum Alcohol   <10   mg/dL


 


Blood Type     


 


Blood Type Recheck     


 


Bld Type Recheck Status     


 


Antibody Screen     


 


Spec Expiration Date     














  10/18/19 10/18/19 Range/Units





  19:06 19:06 


 


WBC    (3.8-10.6)  k/uL


 


RBC    (3.80-5.40)  m/uL


 


Hgb    (11.4-16.0)  gm/dL


 


Hct    (34.0-46.0)  %


 


MCV    (80.0-100.0)  fL


 


MCH    (25.0-35.0)  pg


 


MCHC    (31.0-37.0)  g/dL


 


RDW    (11.5-15.5)  %


 


Plt Count    (150-450)  k/uL


 


Neutrophils %    %


 


Lymphocytes %    %


 


Monocytes %    %


 


Eosinophils %    %


 


Basophils %    %


 


Neutrophils #    (1.3-7.7)  k/uL


 


Lymphocytes #    (1.0-4.8)  k/uL


 


Monocytes #    (0-1.0)  k/uL


 


Eosinophils #    (0-0.7)  k/uL


 


Basophils #    (0-0.2)  k/uL


 


PT  10.1   (9.0-12.0)  sec


 


INR  0.9   (<1.2)  


 


APTT  25.5   (22.0-30.0)  sec


 


Sodium    (137-145)  mmol/L


 


Potassium    (3.5-5.1)  mmol/L


 


Chloride    ()  mmol/L


 


Carbon Dioxide    (22-30)  mmol/L


 


Anion Gap    mmol/L


 


BUN    (7-17)  mg/dL


 


Creatinine    (0.52-1.04)  mg/dL


 


Est GFR (CKD-EPI)AfAm    (>60 ml/min/1.73 sqM)  


 


Est GFR (CKD-EPI)NonAf    (>60 ml/min/1.73 sqM)  


 


Glucose    (74-99)  mg/dL


 


Calcium    (8.4-10.2)  mg/dL


 


Total Bilirubin    (0.2-1.3)  mg/dL


 


AST    (14-36)  U/L


 


ALT    (9-52)  U/L


 


Alkaline Phosphatase    ()  U/L


 


Total Creatine Kinase    ()  U/L


 


CK-MB (CK-2)    (0.0-2.4)  ng/mL


 


CK-MB (CK-2) Rel Index    


 


Troponin I    (0.000-0.034)  ng/mL


 


Total Protein    (6.3-8.2)  g/dL


 


Albumin    (3.5-5.0)  g/dL


 


Amylase    ()  U/L


 


Lipase    ()  U/L


 


Serum Alcohol    mg/dL


 


Blood Type   O Positive  


 


Blood Type Recheck   No Previous Record  


 


Bld Type Recheck Status   CABO Indicated  


 


Antibody Screen   NEGATIVE  


 


Spec Expiration Date   10/21/2019 - 2300  














Disposition


Clinical Impression: 


 Fall, Right shoulder pain, Right ankle pain





Disposition: HOME SELF-CARE


Condition: Good


Instructions (If sedation given, give patient instructions):  Fall Prevention 

for Older Adults (ED), R.I.C.E. Treatment (ED)


Additional Instructions: 


Please use medication as discussed.  Please follow-up with family doctor in the 

next 2 days.  Please return to emergency room if the symptoms increase or worsen

 or for any other concerns.


Is patient prescribed a controlled substance at d/c from ED?: No


Referrals: 


Car Rees DO [Primary Care Provider] - 1-2 days


Time of Disposition: 20:49

## 2019-10-18 NOTE — XR
EXAMINATION TYPE: XR pelvis AP view

 

DATE OF EXAM: 10/18/2019

 

COMPARISON: NONE

 

HISTORY: Fall. Pain.

 

TECHNIQUE: Single view

 

FINDINGS: The pelvic ring is intact. Proximal femurs are intact. There is acetabular spurring. There 
is no evidence of femoral fracture. Sacroiliac joints appear normal.

 

IMPRESSION: Negative exam. No fracture.

## 2021-06-28 ENCOUNTER — HOSPITAL ENCOUNTER (EMERGENCY)
Dept: HOSPITAL 47 - EC | Age: 86
Discharge: HOME | End: 2021-06-28
Payer: MEDICARE

## 2021-06-28 VITALS — DIASTOLIC BLOOD PRESSURE: 80 MMHG | SYSTOLIC BLOOD PRESSURE: 131 MMHG

## 2021-06-28 VITALS — HEART RATE: 80 BPM

## 2021-06-28 VITALS — RESPIRATION RATE: 18 BRPM | TEMPERATURE: 98.2 F

## 2021-06-28 DIAGNOSIS — I25.10: ICD-10-CM

## 2021-06-28 DIAGNOSIS — M54.2: ICD-10-CM

## 2021-06-28 DIAGNOSIS — Z79.899: ICD-10-CM

## 2021-06-28 DIAGNOSIS — I48.0: ICD-10-CM

## 2021-06-28 DIAGNOSIS — R42: ICD-10-CM

## 2021-06-28 DIAGNOSIS — Z88.1: ICD-10-CM

## 2021-06-28 DIAGNOSIS — Z79.82: ICD-10-CM
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
## 2021-06-28 DIAGNOSIS — Z90.722: ICD-10-CM

## 2021-06-28 DIAGNOSIS — J44.9: ICD-10-CM

## 2021-06-28 DIAGNOSIS — Z88.5: ICD-10-CM

## 2021-06-28 DIAGNOSIS — Z95.0: ICD-10-CM

## 2021-06-28 DIAGNOSIS — M19.90: ICD-10-CM

## 2021-06-28 DIAGNOSIS — I11.9: ICD-10-CM

## 2021-06-28 DIAGNOSIS — I25.2: ICD-10-CM

## 2021-06-28 DIAGNOSIS — I69.320: ICD-10-CM

## 2021-06-28 DIAGNOSIS — Z79.01: ICD-10-CM
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
## 2021-06-28 DIAGNOSIS — S20.211A: Primary | ICD-10-CM

## 2021-06-28 DIAGNOSIS — Z88.2: ICD-10-CM

## 2021-06-28 LAB
ALBUMIN SERPL-MCNC: 3.7 G/DL (ref 3.5–5)
ALP SERPL-CCNC: 70 U/L (ref 38–126)
ALT SERPL-CCNC: 25 U/L (ref 4–34)
ANION GAP SERPL CALC-SCNC: 5 MMOL/L
AST SERPL-CCNC: 37 U/L (ref 14–36)
BASOPHILS # BLD AUTO: 0 K/UL (ref 0–0.2)
BASOPHILS NFR BLD AUTO: 1 %
BUN SERPL-SCNC: 26 MG/DL (ref 7–17)
CALCIUM SPEC-MCNC: 9.7 MG/DL (ref 8.4–10.2)
CHLORIDE SERPL-SCNC: 104 MMOL/L (ref 98–107)
CO2 SERPL-SCNC: 29 MMOL/L (ref 22–30)
EOSINOPHIL # BLD AUTO: 0.1 K/UL (ref 0–0.7)
EOSINOPHIL NFR BLD AUTO: 2 %
ERYTHROCYTE [DISTWIDTH] IN BLOOD BY AUTOMATED COUNT: 3.92 M/UL (ref 3.8–5.4)
ERYTHROCYTE [DISTWIDTH] IN BLOOD: 13.7 % (ref 11.5–15.5)
GLUCOSE SERPL-MCNC: 112 MG/DL (ref 74–99)
HCT VFR BLD AUTO: 34.9 % (ref 34–46)
HGB BLD-MCNC: 11.5 GM/DL (ref 11.4–16)
LYMPHOCYTES # SPEC AUTO: 1.2 K/UL (ref 1–4.8)
LYMPHOCYTES NFR SPEC AUTO: 21 %
MCH RBC QN AUTO: 29.5 PG (ref 25–35)
MCHC RBC AUTO-ENTMCNC: 33.1 G/DL (ref 31–37)
MCV RBC AUTO: 89 FL (ref 80–100)
MONOCYTES # BLD AUTO: 0.3 K/UL (ref 0–1)
MONOCYTES NFR BLD AUTO: 6 %
NEUTROPHILS # BLD AUTO: 3.7 K/UL (ref 1.3–7.7)
NEUTROPHILS NFR BLD AUTO: 68 %
PH UR: 6 [PH] (ref 5–8)
PLATELET # BLD AUTO: 210 K/UL (ref 150–450)
POTASSIUM SERPL-SCNC: 4.3 MMOL/L (ref 3.5–5.1)
PROT SERPL-MCNC: 6.2 G/DL (ref 6.3–8.2)
SODIUM SERPL-SCNC: 138 MMOL/L (ref 137–145)
SP GR UR: 1.01 (ref 1–1.03)
UROBILINOGEN UR QL STRIP: <2 MG/DL (ref ?–2)
WBC # BLD AUTO: 5.4 K/UL (ref 3.8–10.6)

## 2021-06-28 PROCEDURE — 72125 CT NECK SPINE W/O DYE: CPT

## 2021-06-28 PROCEDURE — 83605 ASSAY OF LACTIC ACID: CPT

## 2021-06-28 PROCEDURE — 81003 URINALYSIS AUTO W/O SCOPE: CPT

## 2021-06-28 PROCEDURE — 84484 ASSAY OF TROPONIN QUANT: CPT

## 2021-06-28 PROCEDURE — 70450 CT HEAD/BRAIN W/O DYE: CPT

## 2021-06-28 PROCEDURE — 85025 COMPLETE CBC W/AUTO DIFF WBC: CPT

## 2021-06-28 PROCEDURE — 93005 ELECTROCARDIOGRAM TRACING: CPT

## 2021-06-28 PROCEDURE — 96360 HYDRATION IV INFUSION INIT: CPT

## 2021-06-28 PROCEDURE — 36415 COLL VENOUS BLD VENIPUNCTURE: CPT

## 2021-06-28 PROCEDURE — 99285 EMERGENCY DEPT VISIT HI MDM: CPT

## 2021-06-28 PROCEDURE — 96361 HYDRATE IV INFUSION ADD-ON: CPT

## 2021-06-28 PROCEDURE — 80053 COMPREHEN METABOLIC PANEL: CPT

## 2021-06-28 NOTE — ED
Fall HPI





- General


Chief Complaint: Fall


Stated Complaint: Fall


Time Seen by Provider: 06/28/21 11:50


Source: patient, EMS


Mode of arrival: EMS





- History of Present Illness


Initial Comments: 





Patient is an 87-year-old female with history of heart disease, CVA, presenting 

to the emergency Department with complaints of dizziness as well as a fall.  

Patient is on eliquis.  She is complaining of some mild neck discomfort, some 

discomfort in the right side of her ribs as well.  Patient does have history of 

stroke and at her baseline has some expressive aphasia, patient's daughter is 

here with her now and states that she is at her baseline.  She did not lose 

consciousness.  She does have episodes of dizziness and normally takes meclizine

for this.  She did take a tablet of meclizine this morning.  She has one more 

day left of antibiotic for a UTI.  She's had no abdominal pain, nausea or 

vomiting.  She's been eating and drinking as normal.  She denies any pain in her

extremities.  She denies having a headache, no blurry vision, no dizziness at 

this time.  No chest pain or shortness of breath.  She has no further complaints

at this time.  Her vital signs upon arrival are stable.





- Related Data


                                Home Medications











 Medication  Instructions  Recorded  Confirmed


 


Cholecalciferol [Vitamin D3 (25 2,000 unit PO DAILY 06/20/17 06/28/21





Mcg = 1000 Iu)]   


 


Vitamin B Complex 1 cap PO DAILY 06/20/17 06/28/21


 


Vits A,C,E/Lutein/Minerals 1 tab PO DAILY 06/20/17 06/28/21





[Ocuvite with Lutein Tablet]   


 


Aspirin 81 mg PO HS 07/20/19 06/28/21


 


Atorvastatin [Lipitor] 20 mg PO HS 07/20/19 06/28/21


 


Calcium Carbonate/Vitamin D3 1 tab PO HS 07/20/19 06/28/21





[Calcium 600-Vit D3 800 Caplet]   


 


Furosemide [Lasix] 20 mg PO DAILY 07/20/19 06/28/21


 


Propranolol HCl [Inderal LA] 60 mg PO DAILY 07/20/19 06/28/21


 


Spironolactone [Aldactone] 12.5 mg PO HS 07/20/19 06/28/21


 


Apixaban [Eliquis] 2.5 mg PO BID 06/28/21 06/28/21


 


Famotidine [Pepcid] 20 mg PO BID 06/28/21 06/28/21


 


Potassium Chloride ER [K-Dur 10] 10 meq PO DAILY 06/28/21 06/28/21











                                    Allergies











Allergy/AdvReac Type Severity Reaction Status Date / Time


 


Sulfa (Sulfonamide Allergy  Unknown Verified 06/28/21 13:28





Antibiotics)     


 


clindamycin AdvReac Severe Chest Pain Verified 06/28/21 13:28





   Followed  





   by Heart  





   Attack  


 


narcotics AdvReac Severe Can not Uncoded 10/18/19 18:13





   tolerate  














Review of Systems


ROS Statement: 


Those systems with pertinent positive or pertinent negative responses have been 

documented in the HPI.





ROS Other: All systems not noted in ROS Statement are negative.





Past Medical History


Past Medical History: Coronary Artery Disease (CAD), Chest Pain / Angina, COPD, 

CVA/TIA, Eye Disorder, Hypertension, Myocardial Infarction (MI), Mitral Valve 

Prolapse (MVP), Osteoarthritis (OA)


Additional Past Medical History / Comment(s): MITRAL VALVE PROLAPSE, MAC 

DEGERATION, PAST PAROXYSMAL AFIB, CHRONIC BRONCHITIS, PNE X3, STRESS TEST, 

SHINGLE X2 IN 1975, HIATAL HERNIA, INCONT OF URINE WEARS A BRIEF.


Last Myocardial Infarction Date:: UNK


History of Any Multi-Drug Resistant Organisms: None Reported


Past Surgical History: Adenoidectomy, Appendectomy, Cholecystectomy, Heart 

Catheterization, Pacemaker, Tonsillectomy


Additional Past Surgical History / Comment(s): CARDIAC ABLATION, RT KNEE 

ARTHROSCOPY, OVARIES REMOVED 1996,LT PINKY FINGER SX, ROBERT BUNIONECTOMY/HAMMER 

TOE SX, ROBERT CATARACTS, X3 ABD HERNIA SX.


Past Anesthesia/Blood Transfusion Reactions: Previous Problems w/ Anesthesia


Additional Past Anesthesia/Blood Transfusion Reaction / Comment(s): DIFFICULTY 

WAKING UP AFTER RT KNEE SX.   PAST BLOOD TRANSFUSION-NO REACTION


Type of Cardiac Device: Permanent Pacemaker


Device Placement Date:: 2009


Past Psychological History: No Psychological Hx Reported


Past Alcohol Use History: Occasional


Past Drug Use History: None Reported





General Exam





- General Exam Comments


Initial Comments: 





GENERAL: 


Patient is well-developed and well-nourished.  Patient is nontoxic and in no 

acute distress.





HEAD: 


Atraumatic, normocephalic.





EYES:


Pupils equal round and reactive to light, extraocular movements intact, sclera 

anicteric, conjunctiva are normal.  Eyelids were unremarkable.





ENT: 


TMs normal, nares patent, oropharynx clear without exudates.  Moist mucous 

membranes.





NECK: 


Patient did arrive in c-collar, once cleared, patient has full, pain-free range 

of motion, no midline tenderness. Supple without lymphadenopathy or JVD.





LUNGS:


Unlabored respirations.  Breath sounds clear to auscultation bilaterally and 

equal.  No wheezes rales or rhonchi.





HEART:


Regular rate and rhythm without murmurs, rubs or gallops.





ABDOMEN: 


Soft, nontender, normoactive bowel sounds.  No guarding, no rebound.  No masses 

appreciated.





: Deferred 





MUSCULOSKELETAL: 


Normal extremities with adequate strength and normal range of motion, no pitting

or edema.  No clubbing or cyanosis.  Patient has pain with palpation along the 

right posterior and lateral ribs.  She does have an abrasion to this area with 

some mild bruising.





NEUROLOGICAL: 


Patient is alert and oriented x 3.  Motor and sensory are also intact.  Cranial 

nerves II through XII grossly intact.  Symmetrical smile.  Normal speech for 

patient, normal gait.   





PSYCH:


Normal mood, normal affect.





SKIN:


 Warm, Dry, normal turgor.  Patient has a few superficial bruises from her fall 

but no lacerations.


Limitations: no limitations





Course


                                   Vital Signs











  06/28/21 06/28/21 06/28/21





  10:57 12:10 13:00


 


Temperature 98.2 F  


 


Pulse Rate 63 60 62


 


Respiratory 18 18 18





Rate   


 


Blood Pressure 138/79 146/77 131/71


 


O2 Sat by Pulse 96 94 L 97





Oximetry   














  06/28/21





  14:24


 


Temperature 


 


Pulse Rate 60


 


Respiratory 18





Rate 


 


Blood Pressure 131/80


 


O2 Sat by Pulse 96





Oximetry 














Medical Decision Making





- Medical Decision Making





Patient is a 87-year-old female with history of heart disease, previous TIAs, 

presenting after a fall at her home.  She is on eliquis, was complaining of some

mild neck pain, right-sided rib pain.  She is at baseline neurologically 

according to daughter.  She has some mild expressive aphasia which is her 

baseline.  There is no loss of consciousness. She does have history of dizziness

normally, takes meclizine as needed.  Patient's labs show no acute abnorm

alities, creatinine is stable, troponin is normal, urine is normal.  CT of the 

brain and C-spine show no acute intracranial abnormality, no acute fracture of 

the C-spine.  Chest x-ray and right rib x-rays revealed no acute process.  

Patient is resting comfortably, eating and reexamination.  She states she feels 

well and wishes to go home.  She will follow-up with her primary care physician.

 Patient's daughter is in agreement with this plan of care to.  I recommended 

Tylenol for any discomfort, ice to the area.  She is stable for discharge and is

in agreement with this plan of care.  Case discussed with Dr. Dominguez.





- Lab Data


Result diagrams: 


                                 06/28/21 12:05





                                 06/28/21 12:05


                                   Lab Results











  06/28/21 06/28/21 06/28/21 Range/Units





  12:05 12:05 12:05 


 


WBC  5.4    (3.8-10.6)  k/uL


 


RBC  3.92    (3.80-5.40)  m/uL


 


Hgb  11.5    (11.4-16.0)  gm/dL


 


Hct  34.9    (34.0-46.0)  %


 


MCV  89.0    (80.0-100.0)  fL


 


MCH  29.5    (25.0-35.0)  pg


 


MCHC  33.1    (31.0-37.0)  g/dL


 


RDW  13.7    (11.5-15.5)  %


 


Plt Count  210    (150-450)  k/uL


 


MPV  7.1    


 


Neutrophils %  68    %


 


Lymphocytes %  21    %


 


Monocytes %  6    %


 


Eosinophils %  2    %


 


Basophils %  1    %


 


Neutrophils #  3.7    (1.3-7.7)  k/uL


 


Lymphocytes #  1.2    (1.0-4.8)  k/uL


 


Monocytes #  0.3    (0-1.0)  k/uL


 


Eosinophils #  0.1    (0-0.7)  k/uL


 


Basophils #  0.0    (0-0.2)  k/uL


 


Sodium    138  (137-145)  mmol/L


 


Potassium    4.3  (3.5-5.1)  mmol/L


 


Chloride    104  ()  mmol/L


 


Carbon Dioxide    29  (22-30)  mmol/L


 


Anion Gap    5  mmol/L


 


BUN    26 H  (7-17)  mg/dL


 


Creatinine    1.41 H  (0.52-1.04)  mg/dL


 


Est GFR (CKD-EPI)AfAm    39  (>60 ml/min/1.73 sqM)  


 


Est GFR (CKD-EPI)NonAf    34  (>60 ml/min/1.73 sqM)  


 


Glucose    112 H  (74-99)  mg/dL


 


Plasma Lactic Acid Kristian     (0.7-2.0)  mmol/L


 


Calcium    9.7  (8.4-10.2)  mg/dL


 


Total Bilirubin    0.8  (0.2-1.3)  mg/dL


 


AST    37 H  (14-36)  U/L


 


ALT    25  (4-34)  U/L


 


Alkaline Phosphatase    70  ()  U/L


 


Troponin I     (0.000-0.034)  ng/mL


 


Total Protein    6.2 L  (6.3-8.2)  g/dL


 


Albumin    3.7  (3.5-5.0)  g/dL


 


Urine Color   Yellow   


 


Urine Appearance   Clear   (Clear)  


 


Urine pH   6.0   (5.0-8.0)  


 


Ur Specific Gravity   1.012   (1.001-1.035)  


 


Urine Protein   Negative   (Negative)  


 


Urine Glucose (UA)   Negative   (Negative)  


 


Urine Ketones   Negative   (Negative)  


 


Urine Blood   Negative   (Negative)  


 


Urine Nitrite   Negative   (Negative)  


 


Urine Bilirubin   Negative   (Negative)  


 


Urine Urobilinogen   <2.0   (<2.0)  mg/dL


 


Ur Leukocyte Esterase   Negative   (Negative)  














  06/28/21 06/28/21 Range/Units





  12:05 12:05 


 


WBC    (3.8-10.6)  k/uL


 


RBC    (3.80-5.40)  m/uL


 


Hgb    (11.4-16.0)  gm/dL


 


Hct    (34.0-46.0)  %


 


MCV    (80.0-100.0)  fL


 


MCH    (25.0-35.0)  pg


 


MCHC    (31.0-37.0)  g/dL


 


RDW    (11.5-15.5)  %


 


Plt Count    (150-450)  k/uL


 


MPV    


 


Neutrophils %    %


 


Lymphocytes %    %


 


Monocytes %    %


 


Eosinophils %    %


 


Basophils %    %


 


Neutrophils #    (1.3-7.7)  k/uL


 


Lymphocytes #    (1.0-4.8)  k/uL


 


Monocytes #    (0-1.0)  k/uL


 


Eosinophils #    (0-0.7)  k/uL


 


Basophils #    (0-0.2)  k/uL


 


Sodium    (137-145)  mmol/L


 


Potassium    (3.5-5.1)  mmol/L


 


Chloride    ()  mmol/L


 


Carbon Dioxide    (22-30)  mmol/L


 


Anion Gap    mmol/L


 


BUN    (7-17)  mg/dL


 


Creatinine    (0.52-1.04)  mg/dL


 


Est GFR (CKD-EPI)AfAm    (>60 ml/min/1.73 sqM)  


 


Est GFR (CKD-EPI)NonAf    (>60 ml/min/1.73 sqM)  


 


Glucose    (74-99)  mg/dL


 


Plasma Lactic Acid Kristian  1.1   (0.7-2.0)  mmol/L


 


Calcium    (8.4-10.2)  mg/dL


 


Total Bilirubin    (0.2-1.3)  mg/dL


 


AST    (14-36)  U/L


 


ALT    (4-34)  U/L


 


Alkaline Phosphatase    ()  U/L


 


Troponin I   <0.012  (0.000-0.034)  ng/mL


 


Total Protein    (6.3-8.2)  g/dL


 


Albumin    (3.5-5.0)  g/dL


 


Urine Color    


 


Urine Appearance    (Clear)  


 


Urine pH    (5.0-8.0)  


 


Ur Specific Gravity    (1.001-1.035)  


 


Urine Protein    (Negative)  


 


Urine Glucose (UA)    (Negative)  


 


Urine Ketones    (Negative)  


 


Urine Blood    (Negative)  


 


Urine Nitrite    (Negative)  


 


Urine Bilirubin    (Negative)  


 


Urine Urobilinogen    (<2.0)  mg/dL


 


Ur Leukocyte Esterase    (Negative)  














- EKG Data


EKG Comments: 





Demand pacemaker, interpretation is based on intrinsic rhythm, A. fib with 

premature ventricular complexes, nonspecific ST and T-wave abnormalities.  No 

signs of acute process.  Ventricular rate 61, QRS duration 68, .





Disposition


Clinical Impression: 


 Fall, Contusion of rib on right side





Disposition: HOME SELF-CARE


Condition: Stable


Instructions (If sedation given, give patient instructions):  Fall Prevention 

for Older Adults (ED)


Additional Instructions: 


Please return to the Emergency Department if symptoms worsen or any other 

concerns.


Recommend Tylenol for any discomfort, ice to the area.  


Please follow up with her primary care physician.  


Please use walker for assistance with ambulation.


Is patient prescribed a controlled substance at d/c from ED?: No


Referrals: 


Cra Rees DO [Primary Care Provider] - 1-2 days


Time of Disposition: 14:48

## 2021-06-28 NOTE — CT
EXAMINATION TYPE: CT brain suzy bernard con

 

DATE OF EXAM: 6/28/2021

 

COMPARISON: 10/18/2019

 

HISTORY: 87-year-old female fall today, blood thinner, neck pain

 

CT DLP: 1266.6 mGycm

Automated exposure control for dose reduction was used.

 

Technique:  Examination of the head was done in axial plane without intravenous contrast. Coronal and
 sagittal reconstructions performed.

 

CT of the cervical spine was obtained in axial plane without intravenous injection of  contrast mater
ial.  Coronal and sagittal reformatted images were obtained from the axial views for evaluation of  f
ractures, spinal alignment and canal.

 

 

FINDINGS:

 

Head:

There is no evidence of  acute intracranial hemorrhage, acute ischemic changes, mass, mass-effect, or
 extra-axial fluid collection.  There is no effacement of cerebral sulci or basal subarachnoid cister
ns.  There is no hydrocephalus.  There is no midline shift.  Gray-white matter distinction is preserv
ed.

 

Mild generalized supratentorial volume loss. Moderate patchy white matter hypodensities in both cereb
ral hemispheres.

 

There is mild mucosal thickening ethmoid air cells. Mastoid air cells well pneumatized. Leftward nasa
l septal deviation. There is some type of device within the anterior aspect of the right lobe, possib
le ophthalmologic device versus foreign body and can be correlated clinically.

 

 

 

 

Cervical spine:

No craniocervical junction abnormally, predental space widening, or prevertebral soft tissue swelling
.

 

Hypertrophic facet arthropathy left greater than right.

 

Moderate distention of the degenerative changes especially mid to lower cervical spine.

 

Trace grade 1 anterolisthesis C3-C4 and C4-C5. Otherwise, alignment is maintained.

 

Mild disc osteophyte complexes may cause mild narrowing of the spinal canal suggestive that C5-C6 and
 C6-C7.

 

No acute fracture seen of the cervical spine.

 

At C3-C4, there is moderate left and mild right neuroforaminal stenosis.

At C4-C5, moderate left neural foraminal stenosis.

 

At C5-C6, moderate right and mild left neuroforaminal stenosis.

 

At C6/C7, moderate bilateral neuroforaminal stenosis, left greater than right.

 

At C7-T1, mild left neural foraminal stenosis.

 

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

 

Sagittal and coronal reformatted images confirm above findings.

 

 

COMBINED IMPRESSION:

1. Mild generalized atrophy and moderate patchy burden of chronic small vessel ischemic disease. No a
cute intracranial abnormality seen.

2. No acute fracture of the cervical spine. Moderate multilevel spondylotic change with trace grade 1
 anterolisthesis C3-C4 and C4-C5. Variable moderate neural foraminal stenoses as outlined above.

3. Either a foreign body or some type of ophthalmologic device embedded within the anterior right vicki
be. Clinically correlate.

4. 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

## 2021-06-28 NOTE — XR
Exam: 2 view chest x-ray

 

COMPARISON:   10/18/2019 

 

Clinical History: Fell this morning getting out of bed. Pain in right side. 

 

FINDINGS:

Heart size is within normal limits. Atherosclerotic aorta. Left infraclavicular generator device with
 stable leads. There is a small left pleural effusion. A no pneumothorax. Left apical pleural thicken
ing is stable since prior exam from 2019. The right lung is clear. No definite evidence of right rib 
fracture. Degenerative changes of the right shoulder.

 

IMPRESSION:

1. No pneumothorax. No definite evidence of right rib fracture.

## 2022-07-14 ENCOUNTER — HOSPITAL ENCOUNTER (OUTPATIENT)
Dept: HOSPITAL 47 - EC | Age: 87
Setting detail: OBSERVATION
LOS: 1 days | Discharge: HOME | End: 2022-07-15
Attending: INTERNAL MEDICINE | Admitting: INTERNAL MEDICINE
Payer: MEDICARE

## 2022-07-14 DIAGNOSIS — R47.9: ICD-10-CM

## 2022-07-14 DIAGNOSIS — E78.5: ICD-10-CM

## 2022-07-14 DIAGNOSIS — Z98.41: ICD-10-CM

## 2022-07-14 DIAGNOSIS — R60.0: ICD-10-CM

## 2022-07-14 DIAGNOSIS — Z87.891: ICD-10-CM

## 2022-07-14 DIAGNOSIS — Z82.3: ICD-10-CM

## 2022-07-14 DIAGNOSIS — Z88.1: ICD-10-CM

## 2022-07-14 DIAGNOSIS — Z98.890: ICD-10-CM

## 2022-07-14 DIAGNOSIS — I08.1: ICD-10-CM

## 2022-07-14 DIAGNOSIS — Z98.42: ICD-10-CM

## 2022-07-14 DIAGNOSIS — Z91.81: ICD-10-CM

## 2022-07-14 DIAGNOSIS — I12.9: ICD-10-CM

## 2022-07-14 DIAGNOSIS — I49.5: ICD-10-CM

## 2022-07-14 DIAGNOSIS — M19.90: ICD-10-CM

## 2022-07-14 DIAGNOSIS — Z86.73: ICD-10-CM

## 2022-07-14 DIAGNOSIS — N18.9: ICD-10-CM

## 2022-07-14 DIAGNOSIS — Z83.3: ICD-10-CM

## 2022-07-14 DIAGNOSIS — R07.89: Primary | ICD-10-CM

## 2022-07-14 DIAGNOSIS — Z86.19: ICD-10-CM

## 2022-07-14 DIAGNOSIS — I25.2: ICD-10-CM

## 2022-07-14 DIAGNOSIS — Z79.899: ICD-10-CM

## 2022-07-14 DIAGNOSIS — I25.10: ICD-10-CM

## 2022-07-14 DIAGNOSIS — J44.9: ICD-10-CM

## 2022-07-14 DIAGNOSIS — Z95.0: ICD-10-CM

## 2022-07-14 DIAGNOSIS — Z88.2: ICD-10-CM

## 2022-07-14 DIAGNOSIS — Z79.82: ICD-10-CM

## 2022-07-14 DIAGNOSIS — Z88.5: ICD-10-CM

## 2022-07-14 DIAGNOSIS — Z90.49: ICD-10-CM

## 2022-07-14 DIAGNOSIS — I48.19: ICD-10-CM

## 2022-07-14 DIAGNOSIS — Z90.722: ICD-10-CM

## 2022-07-14 LAB
ALBUMIN SERPL-MCNC: 4.3 G/DL (ref 3.5–5)
ALP SERPL-CCNC: 86 U/L (ref 38–126)
ALT SERPL-CCNC: 18 U/L (ref 4–34)
ANION GAP SERPL CALC-SCNC: 6 MMOL/L
APTT BLD: 22.2 SEC (ref 22–30)
AST SERPL-CCNC: 32 U/L (ref 14–36)
BASOPHILS # BLD AUTO: 0 K/UL (ref 0–0.2)
BASOPHILS NFR BLD AUTO: 1 %
BUN SERPL-SCNC: 20 MG/DL (ref 7–17)
CALCIUM SPEC-MCNC: 9.9 MG/DL (ref 8.4–10.2)
CHLORIDE SERPL-SCNC: 103 MMOL/L (ref 98–107)
CO2 SERPL-SCNC: 27 MMOL/L (ref 22–30)
EOSINOPHIL # BLD AUTO: 0.1 K/UL (ref 0–0.7)
EOSINOPHIL NFR BLD AUTO: 2 %
ERYTHROCYTE [DISTWIDTH] IN BLOOD BY AUTOMATED COUNT: 4.12 M/UL (ref 3.8–5.4)
ERYTHROCYTE [DISTWIDTH] IN BLOOD: 14.3 % (ref 11.5–15.5)
GLUCOSE SERPL-MCNC: 106 MG/DL (ref 74–99)
HCT VFR BLD AUTO: 37.1 % (ref 34–46)
HGB BLD-MCNC: 11.8 GM/DL (ref 11.4–16)
INR PPP: 0.9 (ref ?–1.2)
LYMPHOCYTES # SPEC AUTO: 1 K/UL (ref 1–4.8)
LYMPHOCYTES NFR SPEC AUTO: 18 %
MAGNESIUM SPEC-SCNC: 2.1 MG/DL (ref 1.6–2.3)
MCH RBC QN AUTO: 28.5 PG (ref 25–35)
MCHC RBC AUTO-ENTMCNC: 31.7 G/DL (ref 31–37)
MCV RBC AUTO: 90.1 FL (ref 80–100)
MONOCYTES # BLD AUTO: 0.3 K/UL (ref 0–1)
MONOCYTES NFR BLD AUTO: 6 %
NEUTROPHILS # BLD AUTO: 4.1 K/UL (ref 1.3–7.7)
NEUTROPHILS NFR BLD AUTO: 73 %
PLATELET # BLD AUTO: 237 K/UL (ref 150–450)
POTASSIUM SERPL-SCNC: 4.2 MMOL/L (ref 3.5–5.1)
PROT SERPL-MCNC: 7.2 G/DL (ref 6.3–8.2)
PT BLD: 10.3 SEC (ref 9–12)
SODIUM SERPL-SCNC: 136 MMOL/L (ref 137–145)
WBC # BLD AUTO: 5.6 K/UL (ref 3.8–10.6)

## 2022-07-14 PROCEDURE — 85610 PROTHROMBIN TIME: CPT

## 2022-07-14 PROCEDURE — 83735 ASSAY OF MAGNESIUM: CPT

## 2022-07-14 PROCEDURE — 80053 COMPREHEN METABOLIC PANEL: CPT

## 2022-07-14 PROCEDURE — 36415 COLL VENOUS BLD VENIPUNCTURE: CPT

## 2022-07-14 PROCEDURE — 85025 COMPLETE CBC W/AUTO DIFF WBC: CPT

## 2022-07-14 PROCEDURE — 83880 ASSAY OF NATRIURETIC PEPTIDE: CPT

## 2022-07-14 PROCEDURE — 80061 LIPID PANEL: CPT

## 2022-07-14 PROCEDURE — 85730 THROMBOPLASTIN TIME PARTIAL: CPT

## 2022-07-14 PROCEDURE — 93005 ELECTROCARDIOGRAM TRACING: CPT

## 2022-07-14 PROCEDURE — 93306 TTE W/DOPPLER COMPLETE: CPT

## 2022-07-14 PROCEDURE — 71046 X-RAY EXAM CHEST 2 VIEWS: CPT

## 2022-07-14 PROCEDURE — 84484 ASSAY OF TROPONIN QUANT: CPT

## 2022-07-14 PROCEDURE — 99285 EMERGENCY DEPT VISIT HI MDM: CPT

## 2022-07-14 RX ADMIN — HEPARIN SODIUM SCH: 5000 INJECTION INTRAVENOUS; SUBCUTANEOUS at 22:57

## 2022-07-14 NOTE — ED
Chest Pain HPI





- General


Chief Complaint: Chest Pain


Stated Complaint: Chest pain


Time Seen by Provider: 07/14/22 09:21


Source: patient, RN notes reviewed


Mode of arrival: wheelchair


Limitations: no limitations





- History of Present Illness


Initial Comments: 


88-year-old female presents emergency Department chief complaint of chest pain. 

Patient states that she usually has some intermittent pain but states that she's

had more persistent pain this morning.  States on the side of her chest.  

Patient states pain makes it feel short of breath.  Patient has no pleuritic 

pain.  Denies any nausea vomiting diarrhea constipation no fevers or chills no 

cough or cold-like symptoms.  Patient does have significant cardiac history has 

chronic atrial fibrillation, pacemaker.








- Related Data


                                Home Medications











 Medication  Instructions  Recorded  Confirmed


 


Cholecalciferol [Vitamin D3 (25 50 mcg PO DAILY 06/20/17 07/14/22





Mcg = 1000 Iu)]   


 


Vitamin B Complex 1 cap PO DAILY 06/20/17 07/14/22


 


Atorvastatin [Lipitor] 20 mg PO HS 07/20/19 07/14/22


 


Calcium Carbonate/Vitamin D3 1 tab PO DAILY 07/20/19 07/14/22





[Calcium 600-Vit D3 20 Mcg (800   





Iu)]   


 


Spironolactone [Aldactone] 12.5 mg PO HS 07/20/19 07/14/22


 


Aspirin EC [Ecotrin] 325 mg PO HS 01/01/22 07/14/22


 


C,E,Zinc,Copper 11/Iavdt9n/Lut 1 cap PO DAILY 01/01/22 07/14/22





[Ocuvite Adult 50 Plus Softgel]   


 


Omeprazole 20 mg PO DAILY 01/01/22 07/14/22


 


Furosemide [Lasix] 20 mg PO DAILY 07/14/22 07/14/22


 


Potassium Chloride ER [K-Dur 10] 10 meq PO DAILY 07/14/22 07/14/22











                                    Allergies











Allergy/AdvReac Type Severity Reaction Status Date / Time


 


Sulfa (Sulfonamide Allergy  Unknown Verified 07/14/22 10:25





Antibiotics)     


 


clindamycin AdvReac Severe Chest Pain Verified 07/14/22 10:25





   Followed  





   by Heart  





   Attack  


 


narcotics AdvReac Severe Can not Uncoded 07/14/22 09:19





   tolerate  














Review of Systems


ROS Statement: 


Those systems with pertinent positive or pertinent negative responses have been 

documented in the HPI.





ROS Other: All systems not noted in ROS Statement are negative.





EKG Findings





- EKG Comments:


EKG Findings:: EKG for 9:24 A. fib rate of 79 QRS 89 QT//408





Past Medical History


Past Medical History: Coronary Artery Disease (CAD), Chest Pain / Angina, COPD, 

CVA/TIA, Eye Disorder, Hypertension, Myocardial Infarction (MI), Mitral Valve 

Prolapse (MVP), Osteoarthritis (OA)


Additional Past Medical History / Comment(s): MITRAL VALVE PROLAPSE, MAC 

DEGERATION, PAST PAROXYSMAL AFIB, CHRONIC BRONCHITIS, PNE X3, SHINGLE X2 IN 

1975, HIATAL HERNIA, INCONT OF URINE WEARS A BRIEF. port in right eye for mac 

degen (Study group), dual chamber pacemaker.


Last Myocardial Infarction Date:: UNK


History of Any Multi-Drug Resistant Organisms: None Reported


Past Surgical History: Adenoidectomy, Appendectomy, Cholecystectomy, Heart 

Catheterization, Pacemaker, Tonsillectomy


Additional Past Surgical History / Comment(s): CARDIAC ABLATION, RT KNEE 

ARTHROSCOPY, OVARIES REMOVED 1996,LT PINKY FINGER SX, ROBERT BUNIONECTOMY/HAMMER 

TOE SX, ROBERT CATARACTS, X3 ABD HERNIA SX.


Past Anesthesia/Blood Transfusion Reactions: Previous Problems w/ Anesthesia


Additional Past Anesthesia/Blood Transfusion Reaction / Comment(s): DIFFICULTY 

WAKING UP AFTER RT KNEE SX.   PAST BLOOD TRANSFUSION-NO REACTION


Type of Cardiac Device: Permanent Pacemaker


Device Placement Date:: 2009


Past Psychological History: No Psychological Hx Reported


Smoking Status: Former smoker


Past Alcohol Use History: Occasional


Past Drug Use History: None Reported





General Exam


Limitations: no limitations


General appearance: alert, in no apparent distress


Head exam: Present: atraumatic, normocephalic, normal inspection


Eye exam: Present: normal appearance, PERRL, EOMI.  Absent: scleral icterus, 

conjunctival injection, periorbital swelling


ENT exam: Present: normal exam, normal oropharynx, mucous membranes moist


Neck exam: Present: normal inspection, full ROM.  Absent: tenderness, 

meningismus, lymphadenopathy


Respiratory exam: Present: normal lung sounds bilaterally.  Absent: respiratory 

distress, wheezes, rales, rhonchi, stridor


Cardiovascular Exam: Present: regular rate, normal rhythm, normal heart sounds. 

Absent: systolic murmur, diastolic murmur, rubs, gallop, clicks


GI/Abdominal exam: Present: soft, normal bowel sounds.  Absent: distended, 

tenderness, guarding, rebound, rigid





Course


                                   Vital Signs











  07/14/22





  09:17


 


Temperature 97.8 F


 


Pulse Rate 63


 


Respiratory 18





Rate 


 


Blood Pressure 121/58


 


O2 Sat by Pulse 98





Oximetry 














Chest Pain MDM





- MDM





88-year-old female presented for chest pain.  Initial troponin is pending at 

time EKG shows A. fib which is chronic for patient notes elevation.  Patient we 

may for cardiac rule out.





Disposition


Clinical Impression: 


 Chest pain





Disposition: ADMITTED AS IP TO THIS HOSP


Condition: Fair


Referrals: 


Car Rees DO [Primary Care Provider] - 1-2 days


Time of Disposition: 12:05

## 2022-07-14 NOTE — XR
EXAMINATION TYPE: XR chest 2V

 

DATE OF EXAM: 7/14/2022

 

COMPARISON: 1/1/2022

 

HISTORY: Shortness of breath

 

TECHNIQUE:  Frontal and lateral views of the chest are obtained.

 

FINDINGS:

 

Scattered senescent parenchymal changes noted. Hyperinflation compatible with COPD. 

 

No evidence for infiltrate. No evidence for atelectasis.

 

Heart size is stable.

 

Mediastinal structures are stable and grossly unremarkable.

 

No evidence for hilar prominence.

 

Degenerative changes dorsal spine. 

 

IMPRESSION:

1. No evidence for acute pulmonary disease.

## 2022-07-14 NOTE — P.HPIM
History of Present Illness


H&P Date: 07/14/22


Chief Complaint: Chest pain





Patient is an 80-year-old female with PMH of CAD, dyslipidemia, hypertension, 

sick sinus syndrome status post pacemaker 2009, atrial fibrillation, history of 

TIA with speech impediment that presents the ED for chest pain.  Patient is 

unable to effectively describe her chest pain.  She reports chest pain that 

started this morning that his left-sided.  Pain is nonradiating.  Pain is 

exacerbated with movement.  She denies any nausea or vomiting, diaphoresis.  She

told her daughter that she was not feeling well and wanted to go to the 

hospital.  She also reports lower extremity edema that improves with elevation. 

She reports mild shortness of breath with exertion.  She denies any headache, 

fever or chills, cough, palpitations, changes in urination or bowel habits.  No 

changes in appetite or weight.  She denies any dizziness, numbness/weak

ness/tingling of the extremities.  In the ED, her vital signs were stable.  CBC 

was unremarkable.  Coagulation panel negative.  CMP shows sodium 136, BUN of 20,

creatinine 1.26 glucose 106.  Troponin was less than 0.0123 with EKG showing no

ST elevation.  BNP was 1230.  Chest x-ray was negative.  Most recent 

echocardiogram from January 2022 shows EF of 50-55%, mild concentric LVH.  

Patient is admitted for chest pain, rule out acute coronary syndrome with 

cardiology consulted.





Review of systems was performed and is negative except above.





General: [non toxic], [no distress], [appears at stated age]


Derm: [warm], [dry]


Head: [atraumatic], [normocephalic], [symmetric]


Eyes: [EOMI], [no lid lag], [anicteric sclera]


Mouth: [no lip lesion], [mucus membranes moist]


Cardiovascular: [Irregularly regular], [no murmur]


Lungs: [CTA bilateral], [no rhonchi, no rales] , [no accessory muscle use]


Abdominal: [soft], [ nontender to palpation], [no guarding], [no appreciable 

organomegaly]


Ext: [no gross muscle atrophy], [1+ pitting bilateral lower extremity edema], 

[no contractures]


Neuro:  [no focal neuro deficits]


Psych: [Alert], [oriented], [appropriate affect] 





Assessment and plan





Chest pain, atypical, likely musculoskeletal


Chronic kidney disease


History of CAD


Dyslipidemia


Hypertension


Sinus syndrome status post pacemaker 2009


Atrial fibrillation


History of TIA





Troponins have been trended and ACS has been ruled out.  Her chest pain could be

related to musculoskeletal.  Cardiology has been consulted and recommends 

echocardiogram.  She'll be continued on aspirin 325 mg by mouth daily, Lipitor 

20 mg by mouth at bedtime.  Plans interrogate pacemaker.  Telemetry monitoring 

will be ordered.





Her creatinine is at baseline with regard to her chronic kidney disease.





Restarted Lasix for lower extremity edema.





Restart Aldactone for history of hypertension.





DVT prophylaxis: [Heparin]


Discussed with: [Patient and daughter]


Anticipated discharge: [1-2 days]


Anticipated discharge place: [Home]


A total of [35] minutes was spent on the care of this complex patient more than 

50% of the time was spent in counseling and care coordination. 





Patient and her daughter decision maker if she can't make decisions for herself.

  Patient would like to be full code.





Past Medical History


Past Medical History: Coronary Artery Disease (CAD), Chest Pain / Angina, COPD, 

CVA/TIA, Eye Disorder, Hypertension, Myocardial Infarction (MI), Mitral Valve 

Prolapse (MVP), Osteoarthritis (OA)


Additional Past Medical History / Comment(s): MITRAL VALVE PROLAPSE, MAC 

DEGERATION, PAST PAROXYSMAL AFIB, CHRONIC BRONCHITIS, PNE X3, SHINGLE X2 IN 

1975, HIATAL HERNIA, INCONT OF URINE WEARS A BRIEF. port in right eye for mac 

degen (Study group), dual chamber pacemaker.


Last Myocardial Infarction Date:: UNK


History of Any Multi-Drug Resistant Organisms: None Reported


Past Surgical History: Adenoidectomy, Appendectomy, Cholecystectomy, Heart 

Catheterization, Pacemaker, Tonsillectomy


Additional Past Surgical History / Comment(s): CARDIAC ABLATION, RT KNEE 

ARTHROSCOPY, OVARIES REMOVED 1996,LT PINKY FINGER SX, ROBERT BUNIONECTOMY/HAMMER 

TOE SX, ROBERT CATARACTS, X3 ABD HERNIA SX.


Past Anesthesia/Blood Transfusion Reactions: Previous Problems w/ Anesthesia


Additional Past Anesthesia/Blood Transfusion Reaction / Comment(s): DIFFICULTY 

WAKING UP AFTER RT KNEE SX.   PAST BLOOD TRANSFUSION-NO REACTION


Type of Cardiac Device: Permanent Pacemaker


Device Placement Date:: 2009


Past Psychological History: No Psychological Hx Reported


Smoking Status: Former smoker


Past Alcohol Use History: Occasional


Past Drug Use History: None Reported





- Past Family History


  ** Father


Family Medical History: CVA/TIA, Diabetes Mellitus





  ** Mother


Family Medical History: Chest Pain / Angina





Medications and Allergies


                                Home Medications











 Medication  Instructions  Recorded  Confirmed  Type


 


Cholecalciferol [Vitamin D3 (25 50 mcg PO DAILY 06/20/17 07/14/22 History





Mcg = 1000 Iu)]    


 


Vitamin B Complex 1 cap PO DAILY 06/20/17 07/14/22 History


 


Atorvastatin [Lipitor] 20 mg PO HS 07/20/19 07/14/22 History


 


Calcium Carbonate/Vitamin D3 1 tab PO DAILY 07/20/19 07/14/22 History





[Calcium 600-Vit D3 20 Mcg (800    





Iu)]    


 


Spironolactone [Aldactone] 12.5 mg PO HS 07/20/19 07/14/22 History


 


Aspirin EC [Ecotrin] 325 mg PO HS 01/01/22 07/14/22 History


 


C,E,Zinc,Copper 11/Xteym7g/Lut 1 cap PO DAILY 01/01/22 07/14/22 History





[Ocuvite Adult 50 Plus Softgel]    


 


Omeprazole 20 mg PO DAILY 01/01/22 07/14/22 History


 


Furosemide [Lasix] 20 mg PO DAILY 07/14/22 07/14/22 History


 


Potassium Chloride ER [K-Dur 10] 10 meq PO DAILY 07/14/22 07/14/22 History








                                    Allergies











Allergy/AdvReac Type Severity Reaction Status Date / Time


 


Sulfa (Sulfonamide Allergy  Unknown Verified 07/14/22 10:25





Antibiotics)     


 


clindamycin AdvReac Severe Chest Pain Verified 07/14/22 10:25





   Followed  





   by Heart  





   Attack  


 


narcotics AdvReac Severe Can not Uncoded 07/14/22 09:19





   tolerate  














Physical Exam


Vitals: 


                                   Vital Signs











  Temp Pulse Pulse Resp BP BP Pulse Ox


 


 07/14/22 15:00  97.7 F   72  20   110/57  96


 


 07/14/22 13:19   72   16  139/85   98


 


 07/14/22 09:17  97.8 F  63   18  121/58   98








                                Intake and Output











 07/14/22 07/14/22 07/14/22





 06:59 14:59 22:59


 


Other:   


 


  Weight  57.606 kg 














Results


CBC & Chem 7: 


                                 07/14/22 09:54





                                 07/14/22 09:54


Labs: 


                  Abnormal Lab Results - Last 24 Hours (Table)











  07/14/22 Range/Units





  09:54 


 


Sodium  136 L  (137-145)  mmol/L


 


BUN  20 H  (7-17)  mg/dL


 


Creatinine  1.26 H  (0.52-1.04)  mg/dL


 


Glucose  106 H  (74-99)  mg/dL














Thrombosis Risk Factor Assmnt





- Choose All That Apply


Any of the Below Risk Factors Present?: Yes


Each Factor Represents 1 point: Abnormal pulmonary function (COPD)


Other Risk Factors: Yes


Each Risk Factor Represents 3 Points: Age 75 years or older


Other congenital or acquired thrombophilia - If yes, enter type in comment: No


Thrombosis Risk Factor Assessment Total Risk Factor Score: 4


Thrombosis Risk Factor Assessment Level: Moderate Risk

## 2022-07-14 NOTE — P.CRDCN
History of Present Illness


History of present illness: 


HISTORY OF PRESENTING ILLNESS


This is a pleasant 88-year-old female with a past medical history of mild to 

moderate coronary artery disease, dyslipidemia, hypertension, sick sinus 

syndrome status post pacemaker implantation in 2009, persistent atrial 

fibrillation previously on Eliquis however discontinued in the past 6-8 months 

per daughter secondary to increased falls, TIAs (last in 01/2022) with speech 

impairment. She follows with Dr Syed, last seen in 06/2021 at that time 

patient was in atrial fibrillation and on Eliquis.  Patient presents emergency 

department with complaints of chest pain.  Patient with her daughter this 

morning and continued to have intermittent  midsternal chest discomfort and left

sided chest/rib pain as well. It would come and go. Unable to describe how long 

it last. Non-radiating. Non-exertional. Some associated shortness of breath.  

She denies any nausea, vomiting, diaphoresis, palpitations.  She does endorse 

occasional lightheadedness. Denies any headache, worsening speech difficulties, 

weakness, facial droop, visual disturbances. Denies any recent falls. Daughter 

states a few months ago and had knee surgery. Her chest pain has resolved.








DIAGNOSTICS


EKG reveals atrial fibrillation, heart rate 79, PVC noted.  No acute STT wave 

abnormalities stress ischemia.


Last Cardiac Catheterization 06/2017 revealed mild to moderate coronary artery 

disease


Echocardiogram 01/2022 revealed EF 5055 percent, mild mitral regurgitation, 

mild tricuspid regurgitation


Telemetry tracings indicate atrial fibrillation with controlled ventricular 

rates


Chest xray no acute cardiopulmonary process


Laboratory reviewed, CBC unremarkable, sodium 136, potassium 4.2, BUN 20, serum 

creatinine 1.2, magnesium 2.1, troponin negative 2, proBNP 1230


Current home medications include aspirin 325 mg daily, atorvastatin 20 mg 

nightly, Lasix 20 mg daily, spironolactone 12.5 mg nightly





REVIEW OF SYSTEMS


At the time of my exam:


CONSTITUTIONAL: Denies fever or chills.


CARDIOVASCULAR: Denies chest pain, shortness of breath, orthopnea, PND or 

palpitations.


RESPIRATORY: Denies cough. 


GASTROINTESTINAL: Denies abdominal pain, diarrhea, constipation, nausea or 

vomiting.


MUSCULOSKELETAL: Denies myalgias.


NEUROLOGIC: Denies numbness, tingling, headache or weakness.


ENDOCRINE: Denies fatigue, weight change,  polydipsia or polyurina.


GENITOURINARY: Denies burning, hematuria or urgency with micturation.


HEMATOLOGIC: Denies history of anemia or bleeding. 





PHYSICAL EXAMINATION


Blood pressure 121/58, heart rate 63, afebrile, oxygen saturation is 90% room 

air


CONSTITUTIONAL: No apparent distress. 


HEENT: Head is normocephalic. Pupils are equal, round. Sclerae anicteric. Mucous

membranes of the mouth are moist.  No JVD. 


CHEST EXAMINATION: Lungs are clear to auscultation. No chest wall tenderness is 

noted on palpation or with deep breathing. 


HEART EXAMINATION: Regular rate and rhythm. S1, S2 heard. No murmurs, gallops or

rub.


ABDOMEN: Soft, nontender. Positive bowel sounds.


EXTREMITIES: 2+ peripheral pulses, mild to moderate non-pittin bilateral lower 

extremity edema and no calf tenderness.


SKIN: Warm, dry


NEUROLOGIC EXAMINATION: Patient is awake, alert and oriented x3. 





ASSESSMENT


Chest pain, atypical


Mild to moderate coronary artery disease


Dyslipidemia


Hypertension


History of sick sinus syndrome status post pacemaker implantation in 2009


Persistent atrial fibrillation previously on Eliquis however discontinued in the

past 6-8 months per daughter secondary to increased falls


TIAs (last in 01/2022) with speech impairment





PLAN 


Obtain 2D echocardiogram and doppler study to assess cardiac structure and 

function. 


Trend Troponins


Repeat EKG


Monitor telemetry 


Continue home cardiac medications 


Patient not on anticoagulation secondary to increased falls 


Further recommendations based on above workup and clinical course





Thank you kindly for this consultation. 


Nurse practitioner note has been reviewed by physician. Signing provider agrees 

with the documented findings, assessment, and plan of care. 








Past Medical History


Past Medical History: Coronary Artery Disease (CAD), Chest Pain / Angina, COPD, 

CVA/TIA, Eye Disorder, Hypertension, Myocardial Infarction (MI), Mitral Valve 

Prolapse (MVP), Osteoarthritis (OA)


Additional Past Medical History / Comment(s): MITRAL VALVE PROLAPSE, MAC 

DEGERATION, PAST PAROXYSMAL AFIB, CHRONIC BRONCHITIS, PNE X3, SHINGLE X2 IN 

1975, HIATAL HERNIA, INCONT OF URINE WEARS A BRIEF. port in right eye for mac 

degen (Study group), dual chamber pacemaker.


Last Myocardial Infarction Date:: UNK


History of Any Multi-Drug Resistant Organisms: None Reported


Past Surgical History: Adenoidectomy, Appendectomy, Cholecystectomy, Heart 

Catheterization, Pacemaker, Tonsillectomy


Additional Past Surgical History / Comment(s): CARDIAC ABLATION, RT KNEE AR

THROSCOPY, OVARIES REMOVED 1996,LT PINKY FINGER SX, ROBERT BUNIONECTOMY/HAMMER TOE 

SX, ROBERT CATARACTS, X3 ABD HERNIA SX.


Past Anesthesia/Blood Transfusion Reactions: Previous Problems w/ Anesthesia


Additional Past Anesthesia/Blood Transfusion Reaction / Comment(s): DIFFICULTY 

WAKING UP AFTER RT KNEE SX.   PAST BLOOD TRANSFUSION-NO REACTION


Type of Cardiac Device: Permanent Pacemaker


Device Placement Date:: 2009


Past Psychological History: No Psychological Hx Reported


Smoking Status: Former smoker


Past Alcohol Use History: Occasional


Past Drug Use History: None Reported





- Past Family History


  ** Father


Family Medical History: CVA/TIA, Diabetes Mellitus





  ** Mother


Family Medical History: Chest Pain / Angina





Medications and Allergies


                                Home Medications











 Medication  Instructions  Recorded  Confirmed  Type


 


Cholecalciferol [Vitamin D3 (25 50 mcg PO DAILY 06/20/17 07/14/22 History





Mcg = 1000 Iu)]    


 


Vitamin B Complex 1 cap PO DAILY 06/20/17 07/14/22 History


 


Atorvastatin [Lipitor] 20 mg PO HS 07/20/19 07/14/22 History


 


Calcium Carbonate/Vitamin D3 1 tab PO DAILY 07/20/19 07/14/22 History





[Calcium 600-Vit D3 20 Mcg (800    





Iu)]    


 


Spironolactone [Aldactone] 12.5 mg PO HS 07/20/19 07/14/22 History


 


Aspirin EC [Ecotrin] 325 mg PO HS 01/01/22 07/14/22 History


 


C,E,Zinc,Copper 11/Sqyrb3s/Lut 1 cap PO DAILY 01/01/22 07/14/22 History





[Ocuvite Adult 50 Plus Softgel]    


 


Omeprazole 20 mg PO DAILY 01/01/22 07/14/22 History


 


Furosemide [Lasix] 20 mg PO DAILY 07/14/22 07/14/22 History


 


Potassium Chloride ER [K-Dur 10] 10 meq PO DAILY 07/14/22 07/14/22 History








                                    Allergies











Allergy/AdvReac Type Severity Reaction Status Date / Time


 


Sulfa (Sulfonamide Allergy  Unknown Verified 07/14/22 10:25





Antibiotics)     


 


clindamycin AdvReac Severe Chest Pain Verified 07/14/22 10:25





   Followed  





   by Heart  





   Attack  


 


narcotics AdvReac Severe Can not Uncoded 07/14/22 09:19





   tolerate  














Physical Exam


Vitals: 


                                   Vital Signs











  Temp Pulse Resp BP Pulse Ox


 


 07/14/22 09:17  97.8 F  63  18  121/58  98








                                Intake and Output











 07/13/22 07/14/22 07/14/22





 22:59 06:59 14:59


 


Other:   


 


  Weight   57.606 kg














Results





                                 07/14/22 09:54





                                 07/14/22 09:54


                                 Cardiac Enzymes











  07/14/22 07/14/22 Range/Units





  09:54 09:54 


 


AST  32   (14-36)  U/L


 


Troponin I   <0.012  (0.000-0.034)  ng/mL








                                   Coagulation











  07/14/22 Range/Units





  10:55 


 


PT  10.3  (9.0-12.0)  sec


 


APTT  22.2  (22.0-30.0)  sec








                                       CBC











  07/14/22 Range/Units





  09:54 


 


WBC  5.6  (3.8-10.6)  k/uL


 


RBC  4.12  (3.80-5.40)  m/uL


 


Hgb  11.8  (11.4-16.0)  gm/dL


 


Hct  37.1  (34.0-46.0)  %


 


Plt Count  237  (150-450)  k/uL








                          Comprehensive Metabolic Panel











  07/14/22 Range/Units





  09:54 


 


Sodium  136 L  (137-145)  mmol/L


 


Potassium  4.2  (3.5-5.1)  mmol/L


 


Chloride  103  ()  mmol/L


 


Carbon Dioxide  27  (22-30)  mmol/L


 


BUN  20 H  (7-17)  mg/dL


 


Creatinine  1.26 H  (0.52-1.04)  mg/dL


 


Glucose  106 H  (74-99)  mg/dL


 


Calcium  9.9  (8.4-10.2)  mg/dL


 


AST  32  (14-36)  U/L


 


ALT  18  (4-34)  U/L


 


Alkaline Phosphatase  86  ()  U/L


 


Total Protein  7.2  (6.3-8.2)  g/dL


 


Albumin  4.3  (3.5-5.0)  g/dL








                               Current Medications











Generic Name Dose Route Start Last Admin





  Trade Name Freq  PRN Reason Stop Dose Admin


 


Aspirin  325 mg  07/15/22 09:00 





  Aspirin 325 Mg Tab  PO  





  DAILY ALHAJI  


 


Nitroglycerin  0.4 mg  07/14/22 12:06 





  Nitroglycerin Sl Tabs 0.4 Mg Tab  SUBLINGUAL  





  Q5M PRN  





  Chest Pain  








                                Intake and Output











 07/13/22 07/14/22 07/14/22





 22:59 06:59 14:59


 


Other:   


 


  Weight   57.606 kg








                                 Patient Weight











 07/15/22





 06:59


 


Weight 57.606 kg








                                        





                                 07/14/22 09:54 





                                 07/14/22 09:54

## 2022-07-15 VITALS — SYSTOLIC BLOOD PRESSURE: 132 MMHG | DIASTOLIC BLOOD PRESSURE: 82 MMHG | TEMPERATURE: 98.3 F | HEART RATE: 74 BPM

## 2022-07-15 VITALS — RESPIRATION RATE: 18 BRPM

## 2022-07-15 LAB
CHOLEST SERPL-MCNC: 130 MG/DL (ref 0–200)
HDLC SERPL-MCNC: 68.7 MG/DL (ref 40–60)
LDLC SERPL CALC-MCNC: 48.7 MG/DL (ref 0–131)
TRIGL SERPL-MCNC: 63.2 MG/DL (ref 0–149)
VLDLC SERPL CALC-MCNC: 12.64 MG/DL (ref 5–40)

## 2022-07-15 RX ADMIN — HEPARIN SODIUM SCH: 5000 INJECTION INTRAVENOUS; SUBCUTANEOUS at 08:12

## 2022-07-15 NOTE — P.PN
Subjective


This is a pleasant 88-year-old female with a past medical history of mild to 

moderate coronary artery disease, dyslipidemia, hypertension, sick sinus 

syndrome status post pacemaker implantation in 2009, persistent atrial 

fibrillation previously on Eliquis however discontinued in the past 6-8 months 

per daughter secondary to increased falls, TIAs (last in 01/2022) with speech 

impairment. She follows with Dr Syed, last seen in 06/2021 at that time 

patient was in atrial fibrillation and on Eliquis.  Patient presents emergency 

department with complaints of chest pain.  Patient with her daughter this 

morning and continued to have intermittent  midsternal chest discomfort and left

sided chest/rib pain as well. It would come and go. Unable to describe how long 

it last. Non-radiating. Non-exertional. Some associated shortness of breath.  

She denies any nausea, vomiting, diaphoresis, palpitations.  She does endorse 

occasional lightheadedness. Denies any headache, worsening speech difficulties, 

weakness, facial droop, visual disturbances. Denies any recent falls. Daughter 

states a few months ago and had knee surgery. Her chest pain has resolved.





7/15/2022


Patient seen and examined at bedside, no acute distress.  She denies any chest 

pain or shortness of breath.  Her vital signs are stable.  No acute events on 

telemetry.





She's currently maintained on atorvastatin 20 mg nightly, Lasix 20 mg daily, 

spironolactone 12.5 mg nightly.





Labs, troponin negative 3





PHYSICAL EXAMINATION


Vitals reviewed


CONSTITUTIONAL: No apparent distress. 


HEENT: Neck supple No JVD. 


CHEST EXAMINATION: Lungs are clear to auscultation. No chest wall tenderness is 

noted on palpation or with deep breathing. 


HEART EXAMINATION: Regular rate and rhythm. S1, S2 heard. No murmurs, gallops or

rub.


ABDOMEN: Soft, nontender. Positive bowel sounds.


EXTREMITIES: 2+ peripheral pulses, mild to moderate non-pittin bilateral lower 

extremity edema and no calf tenderness.


NEUROLOGIC EXAMINATION: Patient is awake, alert and oriented x3. 





ASSESSMENT


Chest pain, atypical, acute coronary syndrome has ruled out


Mild to moderate coronary artery disease


Dyslipidemia


Hypertension


History of sick sinus syndrome status post pacemaker implantation in 2009


Persistent atrial fibrillation previously on Eliquis however discontinued in the

past 6-8 months per daughter secondary to increased falls


TIAs (last in 01/2022) with speech impairment





PLAN 


We will  review echocardiogram


We will restart patient on Eliquis 2.5 mg BID, spoke with patient and daughter 

in regards to increased risk of bleeding and stroke. Daughter states that 

patient does not have frequent falls and she is currently not falling often at 

home. Patient and daughter is in agreement to restart Eliquis


Continue home cardiac medications  


From cardiology perspective, patient is stable to be discharged home.  Patient 

is a follow up with Dr. Syed on Monday, spoke with patient and daughter to 

keep this appointment.





Nurse practitioner note has been reviewed by physician. Signing provider agrees 

with the documented findings, assessment, and plan of care. 











Objective





- Vital Signs


Vital signs: 


                                   Vital Signs











Temp  98.3 F   07/15/22 07:39


 


Pulse  74   07/15/22 08:00


 


Resp  18   07/15/22 07:39


 


BP  132/82   07/15/22 07:39


 


Pulse Ox  93 L  07/15/22 07:39


 


FiO2      








                                 Intake & Output











 07/14/22 07/15/22 07/15/22





 18:59 06:59 18:59


 


Weight 57.606 kg  


 


Other:   


 


  Voiding Method Diaper Diaper Diaper


 


  # Voids  1 














- Labs


CBC & Chem 7: 


                                 07/14/22 09:54





                                 07/14/22 09:54


Labs: 


                  Abnormal Lab Results - Last 24 Hours (Table)











  07/14/22 07/14/22 Range/Units





  09:54 09:54 


 


Sodium  136 L   (137-145)  mmol/L


 


BUN  20 H   (7-17)  mg/dL


 


Creatinine  1.26 H   (0.52-1.04)  mg/dL


 


Glucose  106 H   (74-99)  mg/dL


 


HDL Cholesterol   68.70 H  (40.00-60.00)  mg/dL

## 2022-07-15 NOTE — P.DS
Providers


Date of admission: 


07/14/22 12:04





Expected date of discharge: 07/15/22


Attending physician: 


Maite Molina MD





Consults: 





                                        





07/14/22 12:06


Consult Physician Urgent 


   Consulting Provider: Christ Syed


   Consult Reason/Comments: chest pain


   Do you want consulting provider notified?: Yes











Primary care physician: 


Car Peterson [facility] Course: 





Patient is an 80-year-old female with PMH of CAD, dyslipidemia, hypertension, 

sick sinus syndrome status post pacemaker 2009, atrial fibrillation, history of 

TIA with speech impediment that presents the ED for chest pain.  Patient is 

unable to effectively describe her chest pain.  She reports chest pain that 

started this morning that his left-sided.  Pain is nonradiating.  Pain is 

exacerbated with movement.  She denies any nausea or vomiting, diaphoresis.  She

told her daughter that she was not feeling well and wanted to go to the 

hospital.  She also reports lower extremity edema that improves with elevation. 

She reports mild shortness of breath with exertion.  She denies any headache, 

fever or chills, cough, palpitations, changes in urination or bowel habits.  No 

changes in appetite or weight.  She denies any dizziness, 

numbness/weakness/tingling of the extremities.  In the ED, her vital signs were 

stable.  CBC was unremarkable.  Coagulation panel negative.  CMP shows sodium 

136, BUN of 20, creatinine 1.26 glucose 106.  Troponin was less than 0.0123 

with EKG showing no ST elevation.  BNP was 1230.  Chest x-ray was negative.  

Most recent echocardiogram from January 2022 shows EF of 50-55%, mild concentric

LVH.  Patient is admitted for chest pain, rule out acute coronary syndrome with 

cardiology consulted.





Troponins were trended and ACS was ruled out.  Cardiology was consulted and 

recommended echocardiogram.  Echocardiogram was pending at the time of this 

note.  Aspirin was discontinued and patient was restarted on her Eliquis.  The 

case was discussed with nursing reported by cardiology cleared the patient for 

discharge.





Patient was seen and examined.  No acute events overnight.  Patient reports 

feeling well.  She denies any chest pain, shortness of breath, palpitations or 

lightheadedness.





General: [non toxic], [no distress], [appears at stated age]


Derm: [warm], [dry]


Head: [atraumatic], [normocephalic], [symmetric]


Eyes: [EOMI], [no lid lag], [anicteric sclera]


Mouth: [no lip lesion], [mucus membranes moist]


Cardiovascular: [Irregularly regular], [no murmur]


Lungs: [CTA bilateral], [no rhonchi, no rales] , [no accessory muscle use]


Abdominal: [soft], [ nontender to palpation], [no guarding], [no appreciable 

organomegaly]


Ext: [no gross muscle atrophy], [1+ pitting bilateral lower extremity edema], 

[no contractures]


Neuro:  [no focal neuro deficits]


Psych: [Alert], [oriented], [appropriate affect] 





Discharge diagnosis:





Chest pain, atypical, likely musculoskeletal


Chronic kidney disease


History of CAD


Dyslipidemia


Hypertension


Sinus syndrome status post pacemaker 2009


Atrial fibrillation


History of TIA





This complication discharge took about 35 minutes to complete.


Pertinent Studies: 





Chest x-ray, EKG, echocardiogram


Patient Condition at Discharge: Stable





Plan - Discharge Summary


Discharge Rx Participant: No


New Discharge Prescriptions: 


New


   Apixaban [Eliquis] 2.5 mg PO BID #60 tab





Continue


   Cholecalciferol [Vitamin D3 (25 Mcg = 1000 Iu)] 50 mcg PO DAILY


   Vitamin B Complex 1 cap PO DAILY


   Spironolactone [Aldactone] 12.5 mg PO HS


   Calcium Carbonate/Vitamin D3 [Calcium 600-Vit D3 20 Mcg (800 Iu)] 1 tab PO 

DAILY


   Atorvastatin [Lipitor] 20 mg PO HS


   Omeprazole 20 mg PO DAILY


   Potassium Chloride ER [K-Dur 10] 10 meq PO DAILY


   C,E,Zinc,Copper 11/Knbsj3e/Lut [Ocuvite Adult 50 Plus Softgel] 1 cap PO DAILY


   Furosemide [Lasix] 20 mg PO DAILY





Discontinued


   Aspirin EC [Ecotrin] 325 mg PO HS


Discharge Medication List





Cholecalciferol [Vitamin D3 (25 Mcg = 1000 Iu)] 50 mcg PO DAILY 06/20/17 

[History]


Vitamin B Complex 1 cap PO DAILY 06/20/17 [History]


Atorvastatin [Lipitor] 20 mg PO HS 07/20/19 [History]


Calcium Carbonate/Vitamin D3 [Calcium 600-Vit D3 20 Mcg (800 Iu)] 1 tab PO DAILY

07/20/19 [History]


Spironolactone [Aldactone] 12.5 mg PO HS 07/20/19 [History]


C,E,Zinc,Copper 11/Eiknb9p/Lut [Ocuvite Adult 50 Plus Softgel] 1 cap PO DAILY 

01/01/22 [History]


Omeprazole 20 mg PO DAILY 01/01/22 [History]


Furosemide [Lasix] 20 mg PO DAILY 07/14/22 [History]


Potassium Chloride ER [K-Dur 10] 10 meq PO DAILY 07/14/22 [History]


Apixaban [Eliquis] 2.5 mg PO BID #60 tab 07/15/22 [Rx]








Follow up Appointment(s)/Referral(s): 


Christ Syed MD [STAFF PHYSICIAN] - 07/18/22


Car Rees DO [Primary Care Provider] - 1-2 days


Activity/Diet/Wound Care/Special Instructions: 


Diet: Cardiac


Follow-up with your PCP within 1-2 days of discharge.


Follow-up with your cardiologist within 1 week of discharge.  Next line take all

medications as advised.


Come back to the ED for worsening chest pain, shortness breath, lightheadedness 

or palpitations.


Discharge Disposition: HOME SELF-CARE

## 2022-07-15 NOTE — CA
Transthoracic Echo Report 

 Name: Susan Joseph 

 MRN:    [id number] 

 Age:    88     Gender:     F 

 

 :    1934 

 Exam Date:     07/15/2022 07:20 

 Exam Location: [facility] 

 Ht (in):     66     Wt (lb):     127 

 Ordering Physician:        Saadia Masterson 

 Attending/Referring Phys: 

 Technician         Tasia Suarez, ASTRID 

 Procedure CPT: 

 Indications:       Chest Pain 

 

 Cardiac Hx:        Duo chamber pacemaker 

 Technical Quality:      Good 

 Contrast 1:                                Total Dose (mL): 

 Contrast 2:                                Total Dose (mL): 

 

 MEASUREMENTS  (Male / Female) Normal Values 

 2D ECHO 

 LV Diastolic Diameter PLAX        4.6 cm                4.2 - 5.9 / 3.9 - 5.3 cm 

 LV Systolic Diameter PLAX         3.1 cm                 

 IVS Diastolic Thickness           1.0 cm                0.6 - 1.0 / 0.6 - 0.9 cm 

 LVPW Diastolic Thickness          1.1 cm                0.6 - 1.0 / 0.6 - 0.9 cm 

 LV Relative Wall Thickness        0.5                    

 RV Internal Dim ED PLAX           2.8 cm                 

 LA Systolic Diameter LX           3.6 cm                3.0 - 4.0 / 2.7 - 3.8 cm 

 

 M-MODE 

 Aortic Root Diameter MM           3.3 cm                 

 MV E Point Septal Separation      0.3 cm                 

 AV Cusp Separation MM             2.1 cm                 

 

 DOPPLER 

 AV Peak Velocity                  115.2 cm/s             

 AV Peak Gradient                  5.3 mmHg               

 MV Area PHT                       4.6 cm???                

 MV Deceleration Time              115.5 ms               

 TR Peak Velocity                  261.3 cm/s             

 TR Peak Gradient                  27.3 mmHg              

 Right Ventricular Systolic Press  30.6 mmHg              

 

 

 FINDINGS 

 Left Ventricle 

 Left ventricular ejection fraction is estimated at 50-55 %. Left ventricular  

 cavity size normal. Borderline left ventricular hypertrophy. 

 

 Right Ventricle 

 Normal right ventricular size and function. Right ventricular systolic pressure  

 within normal limits. 

 

 Right Atrium 

 Normal right atrial size. 

 

 Left Atrium 

 Normal left atrial size. No evidence for an atrial septal defect. 

 

 Mitral Valve 

 Mild to moderate mitral regurgitation. No mitral stenosis. No evidence for  

 mitral valve prolapse. 

 

 Aortic Valve 

 Focal thickening of the aortic valve cusps. 

 

 Tricuspid Valve 

 Mild to moderate tricuspid regurgitation. 

 

 Pulmonic Valve 

 Structurally normal pulmonic valve. 

 

 Pericardium 

 Normal pericardium. No pericardial effusion. 

 

 Aorta 

 Normal size aortic root and proximal ascending aorta. 

 

 CONCLUSIONS 

 1.  Borderline left ventricle systolic function 

 2.  Mild to moderate mitral and tricuspid regurgitation 

 3.  A wire is noted in the right ventricle 

 Previewed by:  

 Dr. Christ Syed MD 

 (Electronically Signed) 

 Final Date:      15 July 2022 11:26

## 2023-01-28 ENCOUNTER — HOSPITAL ENCOUNTER (EMERGENCY)
Dept: HOSPITAL 47 - EC | Age: 88
Discharge: HOME | End: 2023-01-28
Payer: MEDICARE

## 2023-01-28 VITALS — HEART RATE: 58 BPM | DIASTOLIC BLOOD PRESSURE: 60 MMHG | SYSTOLIC BLOOD PRESSURE: 110 MMHG

## 2023-01-28 VITALS — TEMPERATURE: 98 F | RESPIRATION RATE: 16 BRPM

## 2023-01-28 DIAGNOSIS — I25.10: ICD-10-CM

## 2023-01-28 DIAGNOSIS — I10: ICD-10-CM

## 2023-01-28 DIAGNOSIS — F03.90: ICD-10-CM

## 2023-01-28 DIAGNOSIS — Z88.5: ICD-10-CM

## 2023-01-28 DIAGNOSIS — Z79.01: ICD-10-CM

## 2023-01-28 DIAGNOSIS — N39.0: Primary | ICD-10-CM

## 2023-01-28 DIAGNOSIS — I25.2: ICD-10-CM

## 2023-01-28 DIAGNOSIS — J44.9: ICD-10-CM

## 2023-01-28 DIAGNOSIS — Z88.1: ICD-10-CM

## 2023-01-28 DIAGNOSIS — Z79.82: ICD-10-CM

## 2023-01-28 DIAGNOSIS — Z87.891: ICD-10-CM

## 2023-01-28 DIAGNOSIS — Z79.899: ICD-10-CM

## 2023-01-28 DIAGNOSIS — Z88.2: ICD-10-CM

## 2023-01-28 LAB
ALBUMIN SERPL-MCNC: 4.3 G/DL (ref 3.5–5)
ALP SERPL-CCNC: 63 U/L (ref 38–126)
ALT SERPL-CCNC: 27 U/L (ref 4–34)
ANION GAP SERPL CALC-SCNC: 7 MMOL/L
APTT BLD: 21.9 SEC (ref 22–30)
AST SERPL-CCNC: 36 U/L (ref 14–36)
BASOPHILS # BLD AUTO: 0 K/UL (ref 0–0.2)
BASOPHILS NFR BLD AUTO: 1 %
BUN SERPL-SCNC: 28 MG/DL (ref 7–17)
CALCIUM SPEC-MCNC: 9.5 MG/DL (ref 8.4–10.2)
CHLORIDE SERPL-SCNC: 104 MMOL/L (ref 98–107)
CO2 SERPL-SCNC: 28 MMOL/L (ref 22–30)
EOSINOPHIL # BLD AUTO: 0.1 K/UL (ref 0–0.7)
EOSINOPHIL NFR BLD AUTO: 1 %
ERYTHROCYTE [DISTWIDTH] IN BLOOD BY AUTOMATED COUNT: 4.49 M/UL (ref 3.8–5.4)
ERYTHROCYTE [DISTWIDTH] IN BLOOD: 13.7 % (ref 11.5–15.5)
GLUCOSE SERPL-MCNC: 96 MG/DL (ref 74–99)
HCT VFR BLD AUTO: 40.2 % (ref 34–46)
HGB BLD-MCNC: 13 GM/DL (ref 11.4–16)
HYALINE CASTS UR QL AUTO: 6 /LPF (ref 0–2)
INR PPP: 1 (ref ?–1.2)
KETONES UR QL STRIP.AUTO: (no result)
LYMPHOCYTES # SPEC AUTO: 1.2 K/UL (ref 1–4.8)
LYMPHOCYTES NFR SPEC AUTO: 18 %
MCH RBC QN AUTO: 28.9 PG (ref 25–35)
MCHC RBC AUTO-ENTMCNC: 32.3 G/DL (ref 31–37)
MCV RBC AUTO: 89.5 FL (ref 80–100)
MONOCYTES # BLD AUTO: 0.3 K/UL (ref 0–1)
MONOCYTES NFR BLD AUTO: 4 %
NEUTROPHILS # BLD AUTO: 4.9 K/UL (ref 1.3–7.7)
NEUTROPHILS NFR BLD AUTO: 75 %
PH UR: 6.5 [PH] (ref 5–8)
PLATELET # BLD AUTO: 188 K/UL (ref 150–450)
POTASSIUM SERPL-SCNC: 3.9 MMOL/L (ref 3.5–5.1)
PROT SERPL-MCNC: 6.9 G/DL (ref 6.3–8.2)
PROT UR QL: (no result)
PT BLD: 10.1 SEC (ref 9–12)
RBC UR QL: 5 /HPF (ref 0–5)
SODIUM SERPL-SCNC: 139 MMOL/L (ref 137–145)
SP GR UR: 1.02 (ref 1–1.03)
SQUAMOUS UR QL AUTO: 2 /HPF (ref 0–4)
UROBILINOGEN UR QL STRIP: 2 MG/DL (ref ?–2)
WBC # BLD AUTO: 6.5 K/UL (ref 3.8–10.6)
WBC # UR AUTO: >182 /HPF (ref 0–5)

## 2023-01-28 PROCEDURE — 87077 CULTURE AEROBIC IDENTIFY: CPT

## 2023-01-28 PROCEDURE — 99285 EMERGENCY DEPT VISIT HI MDM: CPT

## 2023-01-28 PROCEDURE — 72170 X-RAY EXAM OF PELVIS: CPT

## 2023-01-28 PROCEDURE — 72128 CT CHEST SPINE W/O DYE: CPT

## 2023-01-28 PROCEDURE — 73590 X-RAY EXAM OF LOWER LEG: CPT

## 2023-01-28 PROCEDURE — 72131 CT LUMBAR SPINE W/O DYE: CPT

## 2023-01-28 PROCEDURE — 85730 THROMBOPLASTIN TIME PARTIAL: CPT

## 2023-01-28 PROCEDURE — 87086 URINE CULTURE/COLONY COUNT: CPT

## 2023-01-28 PROCEDURE — 72125 CT NECK SPINE W/O DYE: CPT

## 2023-01-28 PROCEDURE — 85610 PROTHROMBIN TIME: CPT

## 2023-01-28 PROCEDURE — 36415 COLL VENOUS BLD VENIPUNCTURE: CPT

## 2023-01-28 PROCEDURE — 85025 COMPLETE CBC W/AUTO DIFF WBC: CPT

## 2023-01-28 PROCEDURE — 87186 SC STD MICRODIL/AGAR DIL: CPT

## 2023-01-28 PROCEDURE — 81001 URINALYSIS AUTO W/SCOPE: CPT

## 2023-01-28 PROCEDURE — 70450 CT HEAD/BRAIN W/O DYE: CPT

## 2023-01-28 PROCEDURE — 80053 COMPREHEN METABOLIC PANEL: CPT

## 2023-01-28 PROCEDURE — 73630 X-RAY EXAM OF FOOT: CPT

## 2023-01-28 PROCEDURE — 71046 X-RAY EXAM CHEST 2 VIEWS: CPT

## 2023-01-28 PROCEDURE — 73610 X-RAY EXAM OF ANKLE: CPT

## 2023-01-28 NOTE — XR
EXAMINATION TYPE: XR chest 2V

 

DATE OF EXAM: 1/28/2023 11:25 AM

 

COMPARISON: Chest radiographs from 7/14/2022

 

TECHNIQUE: XR chest 2V Frontal and lateral views of the chest.

 

CLINICAL INDICATION:Female, 88 years old with history of fall; 

 

FINDINGS: 

Lungs/Pleura: Prominent interstitial lung markings are seen scattered throughout the lungs with theodore
ening of the diaphragm and increased lucency of the lung apices. No evidence of focal consolidation, 
pneumothorax or pleural effusion. 

 

Pulmonary vascularity: Unremarkable.

Heart/mediastinum: Cardiomediastinal silhouette is prominent in size.  Atherosclerotic calcifications
 are seen in the aorta. Two lead cardiac conduction device overlying the left hemithorax with lead ti
ps projecting over the right ventricle and right atrium.

Musculoskeletal: Degenerative changes of the shoulder joints. Remote right rib fracture.

 

IMPRESSION: 

1. Chronic interstitial changes without acute cardiopulmonary disease process.

 

2. COPD changes.

## 2023-01-28 NOTE — ED
General Adult HPI





- General


Chief complaint: Fall


Stated complaint: fall


Time Seen by Provider: 01/28/23 10:18


Source: patient, family, RN notes reviewed, old records reviewed


Mode of arrival: EMS


Limitations: no limitations





- History of Present Illness


Initial comments: 





Patient is an 80-year-old female with past medical history remarkable for 

dementia, UTIs, TIAs, degenerative disc disease, hypertension, CAD presents 

emergency Department after a fall.  Patient was with her daughter.  Daughter 

heard her fall at home.  She rushed upstairs and found her on the floor.  Does 

not believe she lost consciousness.  Patient is unable to find any history.  She

has dementia with what seems to be chronic aphasia.  Patient is on Eliquis.  

Patient's daughter is the primary historian, and states that she is concerned 

for possible UTI.  Patiently currently patient is at baseline mental status.  

Presents for further evaluation at this time.  Patient's only complaints appear 

to be chronic bilateral feet pain.





- Related Data


                                Home Medications











 Medication  Instructions  Recorded  Confirmed


 


Vitamin B Complex 1 cap PO DAILY 06/20/17 01/28/23


 


Atorvastatin [Lipitor] 20 mg PO DAILY 07/20/19 01/28/23


 


Calcium Carbonate/Vitamin D3 1 tab PO DAILY 07/20/19 01/28/23





[Calcium 600-Vit D3 20 Mcg (800   





Iu)]   


 


Spironolactone [Aldactone] 12.5 mg PO DAILY 07/20/19 01/28/23


 


Omeprazole 20 mg PO DAILY 01/01/22 01/28/23


 


Furosemide [Lasix] 20 mg PO DAILY 07/14/22 01/28/23


 


Potassium Chloride ER [K-Dur 10] 10 meq PO DAILY 07/14/22 01/28/23


 


Aspirin EC [Ecotrin Low Dose] 81 mg PO DAILY 01/28/23 01/28/23


 


Cholecalciferol [Vitamin D3 (25 50 mcg PO DAILY 01/28/23 01/28/23





Mcg = 1000 Iu)]   


 


Meclizine HCl 25 mg PO TID PRN 01/28/23 01/28/23


 


QUEtiapine FUMARATE [SEROquel] 25 mg PO HS 01/28/23 01/28/23








                                  Previous Rx's











 Medication  Instructions  Recorded


 


Apixaban [Eliquis] 2.5 mg PO BID #60 tab 07/15/22


 


Cephalexin [Keflex] 500 mg PO Q12HR 7 Days #14 cap 01/28/23











                                    Allergies











Allergy/AdvReac Type Severity Reaction Status Date / Time


 


cetirizine [From Zyrtec] Allergy  Anaphylaxis Verified 01/28/23 13:31


 


Sulfa (Sulfonamide Allergy  Unknown Verified 07/14/22 10:25





Antibiotics)     


 


clindamycin AdvReac Severe Chest Pain Verified 07/14/22 10:25





   Followed  





   by Heart  





   Attack  


 


narcotics AdvReac Severe Can not Uncoded 07/14/22 09:19





   tolerate  














Review of Systems


ROS Statement: 


Those systems with pertinent positive or pertinent negative responses have been 

documented in the HPI.





ROS Other: All systems not noted in ROS Statement are negative.





Past Medical History


Past Medical History: Coronary Artery Disease (CAD), Chest Pain / Angina, COPD, 

CVA/TIA, Eye Disorder, Hypertension, Myocardial Infarction (MI), Mitral Valve Pr

olapse (MVP), Osteoarthritis (OA)


Additional Past Medical History / Comment(s): MITRAL VALVE PROLAPSE, MAC 

DEGERATION, PAST PAROXYSMAL AFIB, CHRONIC BRONCHITIS, PNE X3, SHINGLE X2 IN 

1975, HIATAL HERNIA, INCONT OF URINE WEARS A BRIEF. port in right eye for mac 

degen (Study group), dual chamber pacemaker.


Last Myocardial Infarction Date:: UNK


History of Any Multi-Drug Resistant Organisms: None Reported


Past Surgical History: Adenoidectomy, Appendectomy, Cholecystectomy, Heart 

Catheterization, Pacemaker, Tonsillectomy


Additional Past Surgical History / Comment(s): CARDIAC ABLATION, RT KNEE 

ARTHROSCOPY, OVARIES REMOVED 1996,LT PINKY FINGER SX, ROBERT BUNIONECTOMY/HAMMER TO

E SX, ROBERT CATARACTS, X3 ABD HERNIA SX.


Past Anesthesia/Blood Transfusion Reactions: Previous Problems w/ Anesthesia


Additional Past Anesthesia/Blood Transfusion Reaction / Comment(s): DIFFICULTY 

WAKING UP AFTER RT KNEE SX.   PAST BLOOD TRANSFUSION-NO REACTION


Type of Cardiac Device: Permanent Pacemaker


Device Placement Date:: 2009


Past Psychological History: No Psychological Hx Reported


Smoking Status: Former smoker


Past Alcohol Use History: Occasional


Past Drug Use History: None Reported





- Past Family History


  ** Father


Family Medical History: CVA/TIA, Diabetes Mellitus





  ** Mother


Family Medical History: Chest Pain / Angina





General Exam





- General Exam Comments


Initial Comments: 





General: Appears in no acute distress.


HEAD:  Normal with no signs of head trauma.  Negative Magaña sign.  Negative 

raccoon eyes.


EYES:  PERRLA, EOMI, conjunctiva normal, no discharge.  Pupils are 3 mm and 

equal bilaterally.


ENT:  Hearing grossly intact, normal oropharynx.


RESPIRATORY:  Clear breath sounds bilaterally.  No wheezes, rales, or rhonchi.  


C/V:  Regular rate and rhythm. S1 and S2 auscultated, no edema, peripheral 

pulses 2+ and intact throughout


ABD:  Abd is soft, nontender, nondistended


EXT: Pelvis is stable.  Normal range of motion of all 4 extremities.  

Nonspecific tenderness palpation of bilateral tib-fib, ankles, feet.  Also non

specific midline thoracic, upper lumbar spine tenderness to palpation.  No 

obvious step-offs or deformities.


SKIN: Skin tear over the posterior right elbow.


NEURO: Alert and oriented 1-2 which is her baseline.  Moving all 4 extremities.

 No obvious deficits.  Baseline confusion.





Limitations: no limitations





Course


                                   Vital Signs











  01/28/23 01/28/23





  10:15 13:09


 


Temperature 98 F 


 


Pulse Rate 87 68


 


Respiratory 16 





Rate  


 


Blood Pressure 138/88 133/84


 


O2 Sat by Pulse 96 





Oximetry  














Medical Decision Making





- Medical Decision Making





Based on the patient's presentation and physical exam, I'm concerned for a fall 

for the patient.  Patient is on blood thinners.  Currently acting her normal 

baseline self with some back pain as well as bilateral lower extremity pain 

which seems chronic.  However we will obtain CT brain, spine.  We will also 

obtain x-rays of the lower extremities, chest, pelvis.  Basic labs will be 

obtained including urinalysis.  Patient's daughter was in agreement this plan.  

Patient does have a small skin tear over her right arm.  Up-to-date on tetanus. 

Vital signs within acceptable limits.  Patient GIVEN a 1 L fluid bolus as well 

as morphine for analgesia.





Laboratory studies are remarkable for a UTI.  Patient does have a history of CK 

D and is only slightly worse than baseline.  Patient's CT imaging was unrema

rkable for any acute fracture or injury.  Patient does have spinal stenosis and 

degenerative changes in the spine.  Plain film x-rays the bilateral lower 

extremities are also negative for any acute fracture.  Chest x-ray and pelvis x-

ray also within acceptable limits.





On reevaluation, I did update the patient's daughter and the patient on her 

workup.  There was a long delay and results of urinalysis as the patient 

initially cannot urinate.  We discussed that she does a UTI but no obvious 

injuries from falls.  I believe it is safe for her to be discharged home.  They 

were in agreement this plan.  Patient will be given a dose of Rocephin as well 

as a prescription for Keflex.  Strict return precautions were discussed.





I will provide the patient with a prescription for Keflex. I instructed the 

patient to follow up with their PCP in the next 1-3 days.  I explained that the 

patient should return to the emergency department if they experience any 

worsening symptoms. Strict return precautions were discussed with the patient. 

The patient expressed understanding of these instructions. I answered all 

questions that the patient had. The patient was discharged home in good conditi

on with their prescriptions and follow up information.





Was pt. sent in by a medical professional or institution (, PA, NP, urgent 

care, hospital, or nursing home...) When possible be specific


@  -No


Did you speak to anyone other than the patient for history (EMS, parent, family,

police, friend...)? What history was obtained from this source 


@  -Yes, due to the patient's dementia primary historian is her daughter who is 

at bedside.  She is also her caregiver.


Did you review nursing and triage notes (agree or disagree)?  Why? 


@  -I reviewed and agree with nursing and triage notes


Were old charts reviewed (outside hosp., previous admission, EMS record, old EKG

, old radiological studies, urgent care reports/EKG's, nursing home records)? 

Report findings 


@  -No old charts were reviewed


Differential Diagnosis (chest pain, altered mental status, abdominal pain women,

abdominal pain men, vaginal bleeding, weakness, fever, dyspnea, syncope, 

headache, dizziness, GI bleed, back pain, seizure, CVA, palpatations, mental 

health)? 


@  -Fall, fracture, intracranial injury, spine injury, UTI, dehydration.  This 

list is not all inclusive.


EKG interpreted by me (3pts min.).


@  -As above


X-rays interpreted by me (1pt min.).


@  -Chest x-ray showed no acute cardiopulmonary process.  Pelvic x-ray, 

bilateral tib-fib, ankle, feet x-rays reveal no acute injuries.


CT interpreted by me (1pt min.).


@  -CT brain shows no acute intracranial process or injury.  CT cervical spine, 

thoracic spine, lumbar spine shows no acute injury or subluxation.  Chronic 

degenerative changes present.


U/S interpreted by me (1pt. min.).


@  -None done


What testing was considered but not performed or refused? (CT, X-rays, U/S, 

labs)? Why?


@  -None


What meds were considered but not given or refused? Why?


@  -None


Did you discuss the management of the patient with other professionals (june rivera i.e. , PA, NP, lab, RT, psych nurse, , , teacher, 

, )? Give summary


@  -No


Was smoking cessation discussed for >3mins.?


@  -No


Was critical care preformed (if so, how long)?


@  -No


Were there social determinants of health that impacted care today? How? 

(Homelessness, low income, unemployed, alcoholism, drug addiction, transportatio

n, low edu. Level, literacy, decrease access to med. care, shelter, rehab)?


@  -No


Was there de-escalation of care discussed even if they declined (Discuss DNR or 

withdrawal of care, Hospice)? DNR status


@  -No


What co-morbidities impacted this encounter? (DM, HTN, Smoking, COPD, CAD, 

Cancer, CVA, ARF, Chemo, Hep., AIDS, mental health diagnosis, sleep apnea, 

morbid obesity)?


@  -Dementia


Was patient admitted / discharged? Hospital course, mention meds given and 

route, prescriptions, significant lab abnormalities, going to OR and other 

pertinent info.


@  -Discharged home.  See above for ED course.


Undiagnosed new problem with uncertain prognosis?


@  -No


Drug Therapy requiring intensive monitoring for toxicity (Heparin, Nitro, 

Insulin, Cardizem)?


@  -No


Were any procedures done?


@  -No


Diagnosis/symptom?


@  -Fall


Acute, or Chronic, or Acute on Chronic?


@  -Acute


Uncomplicated (without systemic symptoms) or Complicated (systemic symptoms)?


@  -Uncomplicated


Side effects of treatment?


@  -No


Exacerbation, Progression, or Severe Exacerbation?


@  -No


Poses a threat to life or bodily function? How? (Chest pain, USA, MI, pneumonia,

PE, COPD, DKA, ARF, appy, cholecystitis, CVA, Diverticulitis, Homicidal, 

Suicidal, threat to staff... and all critical care pts)


@  -No





Diagnosis/symptom?


@  -UTI


Acute, or Chronic, or Acute on Chronic?


@  -Acute


Uncomplicated (without systemic symptoms) or Complicated (systemic symptoms)?


@  -Uncomplicated


Side effects of treatment?


@  -none


Exacerbation, Progression, or Severe Exacerbation]


@  -no


Poses a threat to life or bodily function?


@  -Yes, if untreated can result in significant morbidity and mortality.





Diagnosis/symptom?


@  -Dementia


Acute, or Chronic, or Acute on Chronic?


@  -Chronic


Uncomplicated (without systemic symptoms) or Complicated (systemic symptoms)?


@  -Uncomplicated


Side effects of treatment?


@  -none


Exacerbation, Progression, or Severe Exacerbation]


@  -no


Poses a threat to life or bodily function?


@  -no





Diagnosis/symptom?


@  -Skin tear


Acute, or Chronic, or Acute on Chronic?


@  -Acute


Uncomplicated (without systemic symptoms) or Complicated (systemic symptoms)?


@  -Uncomplicated


Side effects of treatment?


@  -none


Exacerbation, Progression, or Severe Exacerbation]


@  -no


Poses a threat to life or bodily function?


@  -no











- Lab Data


Result diagrams: 


                                 01/28/23 10:41





                                 01/28/23 10:41


                                   Lab Results











  01/28/23 01/28/23 01/28/23 Range/Units





  10:41 10:41 10:41 


 


WBC  6.5    (3.8-10.6)  k/uL


 


RBC  4.49    (3.80-5.40)  m/uL


 


Hgb  13.0    (11.4-16.0)  gm/dL


 


Hct  40.2    (34.0-46.0)  %


 


MCV  89.5    (80.0-100.0)  fL


 


MCH  28.9    (25.0-35.0)  pg


 


MCHC  32.3    (31.0-37.0)  g/dL


 


RDW  13.7    (11.5-15.5)  %


 


Plt Count  188    (150-450)  k/uL


 


MPV  7.9    


 


Neutrophils %  75    %


 


Lymphocytes %  18    %


 


Monocytes %  4    %


 


Eosinophils %  1    %


 


Basophils %  1    %


 


Neutrophils #  4.9    (1.3-7.7)  k/uL


 


Lymphocytes #  1.2    (1.0-4.8)  k/uL


 


Monocytes #  0.3    (0-1.0)  k/uL


 


Eosinophils #  0.1    (0-0.7)  k/uL


 


Basophils #  0.0    (0-0.2)  k/uL


 


PT   10.1   (9.0-12.0)  sec


 


INR   1.0   (<1.2)  


 


APTT   21.9 L   (22.0-30.0)  sec


 


Sodium    139  (137-145)  mmol/L


 


Potassium    3.9  (3.5-5.1)  mmol/L


 


Chloride    104  ()  mmol/L


 


Carbon Dioxide    28  (22-30)  mmol/L


 


Anion Gap    7  mmol/L


 


BUN    28 H  (7-17)  mg/dL


 


Creatinine    1.42 H  (0.52-1.04)  mg/dL


 


Est GFR (CKD-EPI)AfAm    38  (>60 ml/min/1.73 sqM)  


 


Est GFR (CKD-EPI)NonAf    33  (>60 ml/min/1.73 sqM)  


 


Glucose    96  (74-99)  mg/dL


 


Calcium    9.5  (8.4-10.2)  mg/dL


 


Total Bilirubin    1.8 H  (0.2-1.3)  mg/dL


 


AST    36  (14-36)  U/L


 


ALT    27  (4-34)  U/L


 


Alkaline Phosphatase    63  ()  U/L


 


Total Protein    6.9  (6.3-8.2)  g/dL


 


Albumin    4.3  (3.5-5.0)  g/dL


 


Urine Color     


 


Urine Appearance     (Clear)  


 


Urine pH     (5.0-8.0)  


 


Ur Specific Gravity     (1.001-1.035)  


 


Urine Protein     (Negative)  


 


Urine Glucose (UA)     (Negative)  


 


Urine Ketones     (Negative)  


 


Urine Blood     (Negative)  


 


Urine Nitrite     (Negative)  


 


Urine Bilirubin     (Negative)  


 


Urine Urobilinogen     (<2.0)  mg/dL


 


Ur Leukocyte Esterase     (Negative)  


 


Urine RBC     (0-5)  /hpf


 


Urine WBC     (0-5)  /hpf


 


Urine WBC Clumps     (None)  /hpf


 


Ur Squamous Epith Cells     (0-4)  /hpf


 


Urine Bacteria     (None)  /hpf


 


Hyaline Casts     (0-2)  /lpf


 


Urine Mucus     (None)  /hpf


 


Urine Yeast (Budding)     (None)  /hpf














  01/28/23 Range/Units





  13:06 


 


WBC   (3.8-10.6)  k/uL


 


RBC   (3.80-5.40)  m/uL


 


Hgb   (11.4-16.0)  gm/dL


 


Hct   (34.0-46.0)  %


 


MCV   (80.0-100.0)  fL


 


MCH   (25.0-35.0)  pg


 


MCHC   (31.0-37.0)  g/dL


 


RDW   (11.5-15.5)  %


 


Plt Count   (150-450)  k/uL


 


MPV   


 


Neutrophils %   %


 


Lymphocytes %   %


 


Monocytes %   %


 


Eosinophils %   %


 


Basophils %   %


 


Neutrophils #   (1.3-7.7)  k/uL


 


Lymphocytes #   (1.0-4.8)  k/uL


 


Monocytes #   (0-1.0)  k/uL


 


Eosinophils #   (0-0.7)  k/uL


 


Basophils #   (0-0.2)  k/uL


 


PT   (9.0-12.0)  sec


 


INR   (<1.2)  


 


APTT   (22.0-30.0)  sec


 


Sodium   (137-145)  mmol/L


 


Potassium   (3.5-5.1)  mmol/L


 


Chloride   ()  mmol/L


 


Carbon Dioxide   (22-30)  mmol/L


 


Anion Gap   mmol/L


 


BUN   (7-17)  mg/dL


 


Creatinine   (0.52-1.04)  mg/dL


 


Est GFR (CKD-EPI)AfAm   (>60 ml/min/1.73 sqM)  


 


Est GFR (CKD-EPI)NonAf   (>60 ml/min/1.73 sqM)  


 


Glucose   (74-99)  mg/dL


 


Calcium   (8.4-10.2)  mg/dL


 


Total Bilirubin   (0.2-1.3)  mg/dL


 


AST   (14-36)  U/L


 


ALT   (4-34)  U/L


 


Alkaline Phosphatase   ()  U/L


 


Total Protein   (6.3-8.2)  g/dL


 


Albumin   (3.5-5.0)  g/dL


 


Urine Color  Yellow  


 


Urine Appearance  Cloudy H  (Clear)  


 


Urine pH  6.5  (5.0-8.0)  


 


Ur Specific Gravity  1.019  (1.001-1.035)  


 


Urine Protein  1+ H  (Negative)  


 


Urine Glucose (UA)  Negative  (Negative)  


 


Urine Ketones  1+ H  (Negative)  


 


Urine Blood  Negative  (Negative)  


 


Urine Nitrite  Positive H  (Negative)  


 


Urine Bilirubin  Negative  (Negative)  


 


Urine Urobilinogen  2.0  (<2.0)  mg/dL


 


Ur Leukocyte Esterase  Large H  (Negative)  


 


Urine RBC  5  (0-5)  /hpf


 


Urine WBC  >182 H  (0-5)  /hpf


 


Urine WBC Clumps  Many H  (None)  /hpf


 


Ur Squamous Epith Cells  2  (0-4)  /hpf


 


Urine Bacteria  Many H  (None)  /hpf


 


Hyaline Casts  6 H  (0-2)  /lpf


 


Urine Mucus  Rare H  (None)  /hpf


 


Urine Yeast (Budding)  Many H  (None)  /hpf














Disposition


Clinical Impression: 


 UTI (urinary tract infection), Fall, Dementia, Skin tear





Disposition: HOME SELF-CARE


Condition: Good


Instructions (If sedation given, give patient instructions):  Urinary Tract 

Infection in Women (ED)


Prescriptions: 


Cephalexin [Keflex] 500 mg PO Q12HR 7 Days #14 cap


Is patient prescribed a controlled substance at d/c from ED?: No


Referrals: 


Car Rees DO [Primary Care Provider] - 1-2 days


Time of Disposition: 13:35

## 2023-01-28 NOTE — XR
EXAMINATION TYPE: XR pelvis AP view

 

DATE OF EXAM: 1/28/2023 11:25 AM

 

INDICATION: 

Patient age:Female;  88 years old; 

Reason for study: fall, pain;

 

COMPARISON: 10/18/2019

 

TECHNIQUE: The pelvis was examined in a single projection. 

 

FINDINGS: Right hip arthroplasty hardware appears intact. There is joint and joint articulations a le
ft hip. No definitive fracture visualized. There is no evidence of fracture or dislocation. There is 
no soft tissue abnormality.  No abnormal calcifications are present. Multilevel degenerative changes 
of the lower spine.

 

IMPRESSION: 

1.  No acute osseous pathology.

2.  Right hip arthroplasty with hardware in appropriate position.

3.

## 2023-01-28 NOTE — CT
EXAMINATION TYPE: CT brain cspine wo con, CT thor lumbar spine wo con

CT DLP: 1261.6 (accession [id number]), 673.6 (accession [id number]) mGycm, Automated exposure control for
 dose reduction was used.

 

DATE OF EXAM: 1/28/2023 10:49 AM

 

COMPARISON: 1/1/2022 

 

CLINICAL INDICATION:Female, 88 years old with history of fall, pain; Fall.

 

TECHNIQUE: 

Brain: Multiple axial CT images of the brain were obtained without IV contrast. 

Cspine: Axial CT images from the skull base to the inferior aspect of T2 we obtained without intraven
ous contrast. Coronal and sagittal reformatted images were also reviewed. 

T-spine/L-spine: Axial imaging was created with sagittal and coronal reformats.

FINDINGS:

 

Brain:

Extra-axial spaces: No abnormal extra-axial fluid collections.

Ventricular system: Dilatation in proportion to cerebral atrophy.

Cerebral parenchyma: Cerebral atrophy. No acute intraparenchymal hemorrhage or mass effect.  The gray
-white junction is well differentiated. Scattered hypoattenuating areas are seen within the white mat
ter.  

Cerebellum: Unremarkable.

Mass effect: No evidence of midline shift.

Intracranial vasculature: unremarkable

Soft tissues: Normal.

Calvarium/osseous structures: No depressed skull fracture.

Paranasal sinuses and mastoid air cells: Clear.

Visualized orbits: Orbital contents are intact.

 

Spine:

Fracture: None.

Osseous structures: Multilevel degenerative disc disease changes with endplate spurring and disc oste
ophyte complex's. 

Vertebral alignment: Straightening of the cervical alignment. The alignment of the thorax and lumbar 
spine demonstrates scoliosis changes with dextroscoliosis thoracic spine and levoscoliosis lumbar spi
ne.

Spinal canal/Neural Foramina: There is grade 1 anterolisthesis of L3 on L4 and L4 on L5 present with 
some disc uncovering and at least mild to moderate spinal canal stenosis. Disc bulging at L2-L3 with 
mild to moderate spinal canal stenosis.. Scattered neural foraminal stenosis throughout this spine se
condary to facet joint arthropathy and disc bulging. No evidence of high-grade stenosis visualized. N
o significant cervical or thoracic spinal canal stenosis.

Neck soft tissues: Prevertebral soft tissues are within normal limits.

Other: The lungs are clear and the field-of-view. Scattered atherosclerosis of the arterial vasculatu
re. Cardiac conduction leads terminating in the right ventricle and right atrium.

 

IMPRESSION:

1.  No acute intracranial process.

2.  No evidence of spine fracture.

3.  Mild multilevel degenerative disc disease throughout the spine with scoliosis changes.

4.  Nonspecific white matter changes.

5.  Grade 1 anterolisthesis of L3 on L4 and L4 on 5 with at least mild to moderate spinal canal steno
sis.

## 2023-01-28 NOTE — XR
EXAMINATION TYPE: XR tibia fibula bilateral

 

DATE OF EXAM: 1/28/2023 11:25 AM

 

INDICATION: 

Patient age:Female;  88 years old; 

Reason for study: fall, pain; 

 

COMPARISON: None

 

TECHNIQUE: The bilateral tibia/fibula were examined in AP and lateral projections.  

 

FINDINGS: 

Degeneration changes of the knees with disc space narrowing and osteophyte formation. There is calcif
ication of the menisci bilaterally. Atherosclerosis of the arterial vasculature. No evidence of fract
ure.

No evidence of any acute osseous pathology, joint dislocation.

 

IMPRESSION: 

1.  No evidence of acute fracture.

2.  Bilateral chondrocalcinosis.

3.  Bilateral atherosclerosis of the arterial vasculature.

4.  Bilateral moderate to severe osteoporosis.

## 2023-01-28 NOTE — XR
EXAMINATION TYPE: XR ankle complete bilateral

 

DATE OF EXAM: 1/28/2023 11:25 AM

 

INDICATION: 

Patient age:Female;  88 years old; 

Reason for study: fall, pain. 

 

COMPARISON: None

 

TECHNIQUE: The bilateral ankles more imaged in  frontal, lateral and oblique projections.

 

FINDINGS: Joint space narrowing at the tibiotalar joint bilaterally. No evidence of fracture. Calcifi
ed bodies are seen in subcutaneous tissues. Calcaneal plantar spurring worse on the right. Scattered 
degeneration of the joints of the foot.

 

IMPRESSION: 

1.  Mild to moderate degeneration of the tibiotalar joint. No evidence of fracture.

2.  Scattered multifocal osteoarthrosis changes.

## 2023-01-28 NOTE — XR
EXAMINATION TYPE: XR foot complete bilateral

 

DATE OF EXAM: 1/28/2023 11:25 AM

 

INDICATION: 

Patient age:Female;  88 years old; 

Reason for study: fall, pain; 

 

COMPARISON: 8/4/2018

 

TECHNIQUE: The bilateral feet was examined in the AP, oblique, and lateral projections.  

 

FINDINGS: Chronic appearing injury to the left foot proximal phalanx and middle phalanx. Degeneration
 changes throughout the bilateral feet. There is fixation hardware in the left foot distal metatarsal
. Hardware appears intact.

No evidence of any acute osseous pathology. . Calcaneal plantar spurring present bilaterally. Hallux 
valgus bilaterally.

 

IMPRESSION: 

1.  No evidence of acute fracture.

2.  Chronic changes with hallux valgus bilaterally.

3.  Fixation hardware within the left first metatarsal appears intact.

## 2023-01-31 ENCOUNTER — HOSPITAL ENCOUNTER (EMERGENCY)
Dept: HOSPITAL 47 - EC | Age: 88
Discharge: HOME | End: 2023-01-31
Payer: MEDICARE

## 2023-01-31 VITALS — HEART RATE: 67 BPM | SYSTOLIC BLOOD PRESSURE: 153 MMHG | RESPIRATION RATE: 17 BRPM | DIASTOLIC BLOOD PRESSURE: 83 MMHG

## 2023-01-31 VITALS — TEMPERATURE: 98 F

## 2023-01-31 DIAGNOSIS — Z90.49: ICD-10-CM

## 2023-01-31 DIAGNOSIS — Z23: ICD-10-CM

## 2023-01-31 DIAGNOSIS — Z87.891: ICD-10-CM

## 2023-01-31 DIAGNOSIS — Z88.1: ICD-10-CM
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
## 2023-01-31 DIAGNOSIS — Z88.5: ICD-10-CM

## 2023-01-31 DIAGNOSIS — Z95.0: ICD-10-CM

## 2023-01-31 DIAGNOSIS — Z90.89: ICD-10-CM

## 2023-01-31 DIAGNOSIS — Z79.82: ICD-10-CM
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
## 2023-01-31 DIAGNOSIS — S01.01XA: Primary | ICD-10-CM

## 2023-01-31 LAB
ALBUMIN SERPL-MCNC: 3.6 G/DL (ref 3.5–5)
ALP SERPL-CCNC: 60 U/L (ref 38–126)
ALT SERPL-CCNC: 23 U/L (ref 4–34)
ANION GAP SERPL CALC-SCNC: 4 MMOL/L
AST SERPL-CCNC: 27 U/L (ref 14–36)
BASOPHILS # BLD AUTO: 0 K/UL (ref 0–0.2)
BASOPHILS NFR BLD AUTO: 1 %
BUN SERPL-SCNC: 21 MG/DL (ref 7–17)
CALCIUM SPEC-MCNC: 8.8 MG/DL (ref 8.4–10.2)
CHLORIDE SERPL-SCNC: 105 MMOL/L (ref 98–107)
CO2 SERPL-SCNC: 30 MMOL/L (ref 22–30)
EOSINOPHIL # BLD AUTO: 0.1 K/UL (ref 0–0.7)
EOSINOPHIL NFR BLD AUTO: 2 %
ERYTHROCYTE [DISTWIDTH] IN BLOOD BY AUTOMATED COUNT: 4.28 M/UL (ref 3.8–5.4)
ERYTHROCYTE [DISTWIDTH] IN BLOOD: 13.8 % (ref 11.5–15.5)
GLUCOSE SERPL-MCNC: 94 MG/DL (ref 74–99)
HCT VFR BLD AUTO: 38.3 % (ref 34–46)
HGB BLD-MCNC: 12.9 GM/DL (ref 11.4–16)
LYMPHOCYTES # SPEC AUTO: 0.9 K/UL (ref 1–4.8)
LYMPHOCYTES NFR SPEC AUTO: 21 %
MCH RBC QN AUTO: 30.1 PG (ref 25–35)
MCHC RBC AUTO-ENTMCNC: 33.7 G/DL (ref 31–37)
MCV RBC AUTO: 89.4 FL (ref 80–100)
MONOCYTES # BLD AUTO: 0.2 K/UL (ref 0–1)
MONOCYTES NFR BLD AUTO: 5 %
NEUTROPHILS # BLD AUTO: 3.2 K/UL (ref 1.3–7.7)
NEUTROPHILS NFR BLD AUTO: 70 %
PH UR: 5.5 [PH] (ref 5–8)
PLATELET # BLD AUTO: 181 K/UL (ref 150–450)
POTASSIUM SERPL-SCNC: 3.9 MMOL/L (ref 3.5–5.1)
PROT SERPL-MCNC: 6 G/DL (ref 6.3–8.2)
SODIUM SERPL-SCNC: 139 MMOL/L (ref 137–145)
SP GR UR: 1.01 (ref 1–1.03)
UROBILINOGEN UR QL STRIP: <2 MG/DL (ref ?–2)
WBC # BLD AUTO: 4.6 K/UL (ref 3.8–10.6)

## 2023-01-31 PROCEDURE — 12001 RPR S/N/AX/GEN/TRNK 2.5CM/<: CPT

## 2023-01-31 PROCEDURE — 36415 COLL VENOUS BLD VENIPUNCTURE: CPT

## 2023-01-31 PROCEDURE — 90471 IMMUNIZATION ADMIN: CPT

## 2023-01-31 PROCEDURE — 85025 COMPLETE CBC W/AUTO DIFF WBC: CPT

## 2023-01-31 PROCEDURE — 72125 CT NECK SPINE W/O DYE: CPT

## 2023-01-31 PROCEDURE — 99284 EMERGENCY DEPT VISIT MOD MDM: CPT

## 2023-01-31 PROCEDURE — 90715 TDAP VACCINE 7 YRS/> IM: CPT

## 2023-01-31 PROCEDURE — 70450 CT HEAD/BRAIN W/O DYE: CPT

## 2023-01-31 PROCEDURE — 80053 COMPREHEN METABOLIC PANEL: CPT

## 2023-01-31 PROCEDURE — 71046 X-RAY EXAM CHEST 2 VIEWS: CPT

## 2023-01-31 PROCEDURE — 81003 URINALYSIS AUTO W/O SCOPE: CPT

## 2023-01-31 NOTE — XR
EXAMINATION TYPE: XR chest 2V

 

DATE OF EXAM: 1/31/2023 1:28 PM

 

COMPARISON: Chest radiographs from 1/28/2023.

 

TECHNIQUE: XR chest 2V Frontal and lateral views of the chest.

 

CLINICAL INDICATION:Female, 88 years old with history of fall; 

 

FINDINGS: 

Lungs/Pleura: No pneumothorax, focal infiltration, or pleural effusion. Stable scarring in the left l
tai base. Skinfold over the left lateral lung. Hyperinflation. 

Pulmonary vascularity: Unremarkable.

Heart/mediastinum: Cardiomediastinal silhouette is enlarged and stable.  Atherosclerotic calcificatio
ns are seen in the aorta. Two lead cardiac conduction device overlying the left hemithorax with lead 
tips projecting over the right ventricle and right atrium.

Musculoskeletal: No acute osseous pathology. Remote right-sided rib fracture.

 

 

IMPRESSION: 

Chronic changes without evidence for acute process.

## 2023-01-31 NOTE — CT
EXAMINATION TYPE: CT brain cspine wo con

CT DLP: 1214.2 mGycm, Automated exposure control for dose reduction was used.

 

DATE OF EXAM: 1/31/2023 1:18 PM

 

COMPARISON: CT brain C-spine 1/28/2023. 

 

CLINICAL INDICATION:Female, 88 years old with history of trauma; Trauma, Fall, Head injury on thinner
s

 

TECHNIQUE: 

Brain: Multiple axial CT images of the brain were obtained without IV contrast. 

Cspine: Axial CT images from the skull base to the inferior aspect of T2 we obtained without intraven
ous contrast. Coronal and sagittal reformatted images were also reviewed. 

 

FINDINGS:

 

Brain:

Extra-axial spaces: No abnormal extra-axial fluid collections.

Ventricular system: Within normal limits

Cerebral parenchyma: Cerebral atrophy. No acute intraparenchymal hemorrhage or mass effect.  The gray
-white junction is well differentiated. Scattered hypoattenuating areas are seen within the white mat
ter.  

Cerebellum: Unremarkable.

Mass effect: No evidence of midline shift.

Intracranial vasculature: Atherosclerotic calcifications of the intracranial vessels.

Soft tissues: Right posterior parietal contusion with foci of gas consistent with laceration.

Calvarium/osseous structures: No depressed skull fracture.

Paranasal sinuses and mastoid air cells: Clear.

Visualized orbits: The lenses are surgically removed from the globes. 

 

Cervical spine:

Fracture: None.

Osseous structures: Diffuse bone demineralization. Multilevel degenerative disc disease changes with 
endplate spurring and disc osteophyte complex's. Multilevel facet arthropathy.

Vertebral alignment: Unchanged grade 1 anterolisthesis L3 on L4 and L4 on L5, likely degenerative.

Spinal canal/Neural Foramina: Similar disc bulging at L2-L3 with mild spinal canal stenosis. Scattere
d neuroforaminal stenosis throughout the visualized spine secondary to facet joint arthropathy and di
sc bulging.

Neck soft tissues: Prevertebral soft tissues are within normal limits.

Other: The airway is patent. Biapical pleural-parenchymal scarring. Vascular sclerosis.

 

IMPRESSION:

1. No acute intracranial process.

2. Nonspecific white matter changes, likely secondary to chronic small vessel ischemic disease.

3. No evidence of cervical spine fracture.

4. Mild multilevel degenerative disc disease.

## 2023-01-31 NOTE — ED
Fall HPI





- General


Chief Complaint: Fall


Stated Complaint: fall, head injury on thinners


Time Seen by Provider: 01/31/23 12:56


Source: family


Mode of arrival: wheelchair





- History of Present Illness


Initial Comments: 


Patient is an 88-year-old female who presents for evaluation of fall.  Patient 

has dementia and is an unreliable historian.  Daughter states she accidentally 

kept patient's socks on and let her walk which caused her to slip and fall.  

Patient fell in the living room, hitting her head on the ground.  She is on 

blood thinners.  She has not been complaining of pain although she does have a 

small laceration on her scalp.  Denies headache, double vision, blurry vision, 

chest pain and shortness of breath.  Patient evaluated in our emergency 

department 1/28 for fall.  At this time she was diagnosed with a urinary tract 

infection which she was prescribed Keflex for and has been taking.  She denies 

fever, chills, abdominal pain, nausea, vomiting, burning with urination, 

increased urinary frequency/urgency





- Related Data


                                Home Medications











 Medication  Instructions  Recorded  Confirmed


 


Vitamin B Complex 1 cap PO DAILY 06/20/17 01/31/23


 


Atorvastatin [Lipitor] 20 mg PO DAILY 07/20/19 01/31/23


 


Calcium Carbonate/Vitamin D3 1 tab PO DAILY 07/20/19 01/31/23





[Calcium 600-Vit D3 20 Mcg (800   





Iu)]   


 


Spironolactone [Aldactone] 12.5 mg PO DAILY 07/20/19 01/31/23


 


Omeprazole 20 mg PO DAILY 01/01/22 01/31/23


 


Furosemide [Lasix] 20 mg PO DAILY 07/14/22 01/31/23


 


Potassium Chloride ER [K-Dur 10] 10 meq PO DAILY 07/14/22 01/31/23


 


Aspirin EC [Ecotrin Low Dose] 81 mg PO DAILY 01/28/23 01/31/23


 


Cholecalciferol [Vitamin D3 (25 50 mcg PO DAILY 01/28/23 01/31/23





Mcg = 1000 Iu)]   


 


Meclizine HCl 25 mg PO TID PRN 01/28/23 01/31/23


 


QUEtiapine FUMARATE [SEROquel] 25 mg PO HS 01/28/23 01/31/23








                                  Previous Rx's











 Medication  Instructions  Recorded


 


Apixaban [Eliquis] 2.5 mg PO BID #60 tab 07/15/22


 


Cephalexin [Keflex] 500 mg PO Q12HR 7 Days #14 cap 01/28/23











                                    Allergies











Allergy/AdvReac Type Severity Reaction Status Date / Time


 


cetirizine [From Zyrtec] Allergy  Anaphylaxis Verified 01/31/23 14:20


 


Sulfa (Sulfonamide Allergy  Unknown Verified 01/31/23 14:20





Antibiotics)     


 


clindamycin AdvReac Severe Chest Pain Verified 01/31/23 14:20





   Followed  





   by Heart  





   Attack  


 


narcotics AdvReac Severe Can not Uncoded 07/14/22 09:19





   tolerate  














Review of Systems


ROS Statement: 


Those systems with pertinent positive or pertinent negative responses have been 

documented in the HPI.





ROS Other: All systems not noted in ROS Statement are negative.





Past Medical History


Past Medical History: Coronary Artery Disease (CAD), Chest Pain / Angina, COPD, 

CVA/TIA, Dementia, Eye Disorder, Hypertension, Myocardial Infarction (MI), 

Mitral Valve Prolapse (MVP), Osteoarthritis (OA)


Additional Past Medical History / Comment(s): MITRAL VALVE PROLAPSE, MAC 

DEGERATION, PAST PAROXYSMAL AFIB, CHRONIC BRONCHITIS, PNE X3, SHINGLE X2 IN 

1975, HIATAL HERNIA, INCONT OF URINE WEARS A BRIEF. port in right eye for mac 

degen (Study group), dual chamber pacemaker.


Last Myocardial Infarction Date:: UNK


History of Any Multi-Drug Resistant Organisms: None Reported


Past Surgical History: Adenoidectomy, Appendectomy, Cholecystectomy, Heart 

Catheterization, Pacemaker, Tonsillectomy


Additional Past Surgical History / Comment(s): CARDIAC ABLATION, RT KNEE 

ARTHROSCOPY, OVARIES REMOVED 1996,LT PINKY FINGER SX, ROBERT BUNIONECTOMY/HAMMER 

TOE SX, ROBERT CATARACTS, X3 ABD HERNIA SX.


Past Anesthesia/Blood Transfusion Reactions: Previous Problems w/ Anesthesia


Additional Past Anesthesia/Blood Transfusion Reaction / Comment(s): DIFFICULTY 

WAKING UP AFTER RT KNEE SX.   PAST BLOOD TRANSFUSION-NO REACTION


Type of Cardiac Device: Permanent Pacemaker


Device Placement Date:: 2009


Past Psychological History: No Psychological Hx Reported


Smoking Status: Former smoker


Past Alcohol Use History: Occasional


Past Drug Use History: None Reported





- Past Family History


  ** Father


Family Medical History: CVA/TIA, Diabetes Mellitus





  ** Mother


Family Medical History: Chest Pain / Angina





General Exam


General appearance: alert, in no apparent distress


Head exam: Absent: atraumatic, normal inspection (1 cm laceration lower parietal

scalp. nonbleeding)


Eye exam: Present: normal appearance, PERRL, EOMI.  Absent: scleral icterus, 

conjunctival injection, periorbital swelling


Respiratory exam: Present: normal lung sounds bilaterally.  Absent: respiratory 

distress, wheezes, rales, rhonchi, stridor


Cardiovascular Exam: Present: regular rate, normal rhythm, normal heart sounds. 

Absent: systolic murmur, diastolic murmur, rubs, gallop, clicks


GI/Abdominal exam: Present: soft, normal bowel sounds.  Absent: distended, 

tenderness, guarding, rebound, rigid


Neurological exam: Present: alert, CN II-XII intact


Psychiatric exam: Present: normal affect, normal mood


Skin exam: Present: warm, dry, intact, normal color.  Absent: rash





Course


                                   Vital Signs











  01/31/23 01/31/23





  11:29 14:24


 


Temperature 98.0 F 


 


Pulse Rate 66 67


 


Respiratory 14 17





Rate  


 


Blood Pressure 107/66 153/83


 


O2 Sat by Pulse 95 99





Oximetry  














Procedures





- Laceration


  ** Laceration #1


Consent Obtained: verbal consent


Indication: laceration


Site: scalp


Size (cm): 1


Description: irregular


Patient Tolerated Procedure: well, no complications


Additional Comments: 





1 staple





Medical Decision Making





- Medical Decision Making


Was pt. sent in by a medical professional or institution (, PA, NP, urgent 

care, hospital, or nursing home...) When possible be specific


@  -[No]


Did you speak to anyone other than the patient for history (EMS, parent, family,

 police, friend...)? What history was obtained from this source 


@  -[No]


Did you review nursing and triage notes (agree or disagree)?  Why? 


@  -[I reviewed and agree with nursing and triage notes]


Were old charts reviewed (outside hosp., previous admission, EMS record, old 

EKG, old radiological studies, urgent care reports/EKG's, nursing home records)?

Report findings 


@  -Yes, patient recently evaluated in our emergency department for follow-up.  

Was diagnosed with UTI and treated with Keflex


Differential Diagnosis (chest pain, altered mental status, abdominal pain women,

abdominal pain men, vaginal bleeding, weakness, fever, dyspnea, syncope, 

headache, dizziness, GI bleed, back pain, seizure, CVA, palpatations, mental 

health)? 


@  -Differential Weakness:


Hypoglycemia, shock, sepsis, hyponatremia, anemia, infection, MI, ETOH, adverse 

medicine reaction, overdose, stroke, this is not meant to be an all-inclusive 

list.


EKG interpreted by me (3pts min.).


@  -[As above]


X-rays interpreted by me (1pt min.).


@  -Yes, chest x-ray negative for acute process


CT interpreted by me (1pt min.).


@  -Yes, CT of the brain and C-spine without contrast shows no acute 

intracranial process and no evidence of cervical spine fracture


U/S interpreted by me (1pt. min.).


@  -[None done]


What testing was considered but not performed or refused? (CT, X-rays, U/S, 

labs)? Why?


@  -[None]


What meds were considered but not given or refused? Why?


@  -[None]


Did you discuss the management of the patient with other professionals 

(professionals i.e. , PA, NP, lab, RT, psych nurse, , , 

teacher, , )? Give summary


@  -[No]


Was smoking cessation discussed for >3mins.?


@  -[No]


Was critical care preformed (if so, how long)?


@  -[No]


Were there social determinants of health that impacted care today? How? 

(Homelessness, low income, unemployed, alcoholism, drug addiction, 

transportation, low edu. Level, literacy, decrease access to med. care, [facility], 

rehab)?


@  -[No]


Was there de-escalation of care discussed even if they declined (Discuss DNR or 

withdrawal of care, Hospice)? DNR status


@  -[No]


What co-morbidities impacted this encounter? (DM, HTN, Smoking, COPD, CAD, 

Cancer, CVA, ARF, Chemo, Hep., AIDS, mental health diagnosis, sleep apnea, 

morbid obesity)?


@  -[None]


Was patient admitted / discharged? Hospital course, mention meds given and 

route, prescriptions, significant lab abnormalities, going to OR and other 

pertinent info.


@  -Discharged.  No obvious cause for her fall.  Daughter is confident that 

patient slipped due to walking with socks.  Urine is clean.  Labs unremarkable. 

 Scalp laceration well approximated 1 staple.  Wound instruction discussed in 

detail.  Patient to return in 1 week for staple removal


Undiagnosed new problem with uncertain prognosis?


@  -[No]


Drug Therapy requiring intensive monitoring for toxicity (Heparin, Nitro, 

Insulin, Cardizem)?


@  -[No]


Were any procedures done?


@  -yes,lac repair


Diagnosis/symptom?


@  -fall


Acute, or Chronic, or Acute on Chronic?


@  -Acute


Uncomplicated (without systemic symptoms) or Complicated (systemic symptoms)?


@  -Uncomplicated


Side effects of treatment?


@  -[No]


Exacerbation, Progression, or Severe Exacerbation?


@  -[No]


Poses a threat to life or bodily function? How? (Chest pain, USA, MI, pneumonia,

 PE, COPD, DKA, ARF, appy, cholecystitis, CVA, Diverticulitis, Homicidal, 

Suicidal, threat to staff... and all critical care pts)


@  -[No]


Diagnosis/symptom?


@  -Laceration


Acute, or Chronic, or Acute on Chronic?


@  -Acute


Uncomplicated (without systemic symptoms) or Complicated (systemic symptoms)?


@  Uncomplicated


Side effects of treatment?


@  -[none]


Exacerbation, Progression, or Severe Exacerbation]


@  -[no]


Poses a threat to life or bodily function?


@  -[no]





Dr. Dominguez is my attending





- Lab Data


Result diagrams: 


                                 01/31/23 13:32





                                 01/31/23 13:52


                                   Lab Results











  01/31/23 01/31/23 01/31/23 Range/Units





  13:32 13:32 13:52 


 


WBC  4.6    (3.8-10.6)  k/uL


 


RBC  4.28    (3.80-5.40)  m/uL


 


Hgb  12.9    (11.4-16.0)  gm/dL


 


Hct  38.3    (34.0-46.0)  %


 


MCV  89.4    (80.0-100.0)  fL


 


MCH  30.1    (25.0-35.0)  pg


 


MCHC  33.7    (31.0-37.0)  g/dL


 


RDW  13.8    (11.5-15.5)  %


 


Plt Count  181    (150-450)  k/uL


 


MPV  8.5    


 


Neutrophils %  70    %


 


Lymphocytes %  21    %


 


Monocytes %  5    %


 


Eosinophils %  2    %


 


Basophils %  1    %


 


Neutrophils #  3.2    (1.3-7.7)  k/uL


 


Lymphocytes #  0.9 L    (1.0-4.8)  k/uL


 


Monocytes #  0.2    (0-1.0)  k/uL


 


Eosinophils #  0.1    (0-0.7)  k/uL


 


Basophils #  0.0    (0-0.2)  k/uL


 


Sodium    139  (137-145)  mmol/L


 


Potassium    3.9  (3.5-5.1)  mmol/L


 


Chloride    105  ()  mmol/L


 


Carbon Dioxide    30  (22-30)  mmol/L


 


Anion Gap    4  mmol/L


 


BUN    21 H  (7-17)  mg/dL


 


Creatinine    1.04  (0.52-1.04)  mg/dL


 


Est GFR (CKD-EPI)AfAm    56  (>60 ml/min/1.73 sqM)  


 


Est GFR (CKD-EPI)NonAf    48  (>60 ml/min/1.73 sqM)  


 


Glucose    94  (74-99)  mg/dL


 


Calcium    8.8  (8.4-10.2)  mg/dL


 


Total Bilirubin    1.1  (0.2-1.3)  mg/dL


 


AST    27  (14-36)  U/L


 


ALT    23  (4-34)  U/L


 


Alkaline Phosphatase    60  ()  U/L


 


Total Protein    6.0 L  (6.3-8.2)  g/dL


 


Albumin    3.6  (3.5-5.0)  g/dL


 


Urine Color   Light Yellow   


 


Urine Appearance   Clear   (Clear)  


 


Urine pH   5.5   (5.0-8.0)  


 


Ur Specific Gravity   1.010   (1.001-1.035)  


 


Urine Protein   Negative   (Negative)  


 


Urine Glucose (UA)   Negative   (Negative)  


 


Urine Ketones   Negative   (Negative)  


 


Urine Blood   Negative   (Negative)  


 


Urine Nitrite   Negative   (Negative)  


 


Urine Bilirubin   Negative   (Negative)  


 


Urine Urobilinogen   <2.0   (<2.0)  mg/dL


 


Ur Leukocyte Esterase   Negative   (Negative)  














Disposition


Clinical Impression: 


 Fall, Laceration





Disposition: HOME SELF-CARE


Condition: Good


Instructions (If sedation given, give patient instructions):  Laceration (ED), 

Fall Prevention for Older Adults (ED), Staple Care (ED)


Additional Instructions: 


Leave wound uncovered.  Keep wound clean and dry.  Wash with a mild soap. Take 

Tylenol for pain. Follow-up with primary care provider in 1-2 days. Return for 

staple removal in 7  days. Report back to the emergency department if you 

experience new, concerning, or worsening symptoms.


Is patient prescribed a controlled substance at d/c from ED?: No


Referrals: 


Car Rees DO [Primary Care Provider] - 1-2 days


Time of Disposition: 14:25

## 2023-06-05 ENCOUNTER — HOSPITAL ENCOUNTER (EMERGENCY)
Dept: HOSPITAL 47 - EC | Age: 88
Discharge: HOME | End: 2023-06-05
Payer: MEDICARE

## 2023-06-05 VITALS — RESPIRATION RATE: 16 BRPM | TEMPERATURE: 97.6 F

## 2023-06-05 VITALS — DIASTOLIC BLOOD PRESSURE: 80 MMHG | SYSTOLIC BLOOD PRESSURE: 125 MMHG

## 2023-06-05 VITALS — HEART RATE: 74 BPM

## 2023-06-05 DIAGNOSIS — Z88.5: ICD-10-CM

## 2023-06-05 DIAGNOSIS — I25.2: ICD-10-CM
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
## 2023-06-05 DIAGNOSIS — Z88.8: ICD-10-CM

## 2023-06-05 DIAGNOSIS — I10: ICD-10-CM

## 2023-06-05 DIAGNOSIS — Z87.891: ICD-10-CM

## 2023-06-05 DIAGNOSIS — M19.90: ICD-10-CM

## 2023-06-05 DIAGNOSIS — Z79.1: ICD-10-CM

## 2023-06-05 DIAGNOSIS — Z88.2: ICD-10-CM

## 2023-06-05 DIAGNOSIS — I25.10: ICD-10-CM

## 2023-06-05 DIAGNOSIS — Z88.1: ICD-10-CM

## 2023-06-05 DIAGNOSIS — Z79.82: ICD-10-CM

## 2023-06-05 DIAGNOSIS — Z86.73: ICD-10-CM

## 2023-06-05 DIAGNOSIS — S83.91XA: Primary | ICD-10-CM

## 2023-06-05 DIAGNOSIS — J44.9: ICD-10-CM

## 2023-06-05 PROCEDURE — 99285 EMERGENCY DEPT VISIT HI MDM: CPT

## 2023-06-05 PROCEDURE — 70450 CT HEAD/BRAIN W/O DYE: CPT

## 2023-06-05 NOTE — ED
General Adult HPI





- General


Chief complaint: Fall


Stated complaint: fall


Time Seen by Provider: 06/05/23 16:39


Source: patient, family, EMS, RN notes reviewed


Mode of arrival: EMS


Limitations: altered mental status





- History of Present Illness


Initial comments: 





Patient is a pleasant 89-year-old female presenting to the emergency department 

following a fall.  Incident occurred prior to arrival.  Patient does not recall 

the incident very well and has advanced dementia.  Patient complains of some 

right leg discomfort however otherwise does not have specific complaint.  

Daughter arrives later and helps provide history that patient may have hit her 

head.  Patient reportedly stated that she hit her head earlier however does not 

recall this time.  No other areas of injury or concern





- Related Data


                                Home Medications











 Medication  Instructions  Recorded  Confirmed


 


Vitamin B Complex 1 cap PO DAILY 06/20/17 01/31/23


 


Atorvastatin [Lipitor] 20 mg PO DAILY 07/20/19 01/31/23


 


Calcium Carbonate/Vitamin D3 1 tab PO DAILY 07/20/19 01/31/23





[Calcium 600-Vit D3 20 Mcg (800   





Iu)]   


 


Spironolactone [Aldactone] 12.5 mg PO DAILY 07/20/19 01/31/23


 


Omeprazole 20 mg PO DAILY 01/01/22 01/31/23


 


Furosemide [Lasix] 20 mg PO DAILY 07/14/22 01/31/23


 


Potassium Chloride ER [K-Dur 10] 10 meq PO DAILY 07/14/22 01/31/23


 


Aspirin EC [Ecotrin Low Dose] 81 mg PO DAILY 01/28/23 01/31/23


 


Cholecalciferol [Vitamin D3 (25 50 mcg PO DAILY 01/28/23 01/31/23





Mcg = 1000 Iu)]   


 


Meclizine HCl 25 mg PO TID PRN 01/28/23 01/31/23


 


QUEtiapine FUMARATE [SEROquel] 25 mg PO HS 01/28/23 01/31/23








                                  Previous Rx's











 Medication  Instructions  Recorded


 


Apixaban [Eliquis] 2.5 mg PO BID #60 tab 07/15/22


 


Cephalexin [Keflex] 500 mg PO Q12HR 7 Days #14 cap 01/28/23











                                    Allergies











Allergy/AdvReac Type Severity Reaction Status Date / Time


 


cetirizine [From Zyrtec] Allergy  Anaphylaxis Verified 06/05/23 16:18


 


Sulfa (Sulfonamide Allergy  Unknown Verified 06/05/23 16:18





Antibiotics)     


 


clindamycin AdvReac Severe Chest Pain Verified 06/05/23 16:18





   Followed  





   by Heart  





   Attack  


 


narcotics AdvReac Severe Can not Uncoded 06/05/23 16:18





   tolerate  














Review of Systems


ROS Statement: 


Those systems with pertinent positive or pertinent negative responses have been 

documented in the HPI.





ROS Other: All systems not noted in ROS Statement are negative.


Constitutional: Denies: fever


Eyes: Denies: eye pain


ENT: Denies: ear pain


Respiratory: Denies: cough


Cardiovascular: Denies: chest pain


Endocrine: Denies: fatigue


Gastrointestinal: Denies: abdominal pain


Genitourinary: Denies: dysuria


Musculoskeletal: Denies: back pain


Skin: Denies: rash


Neurological: Denies: headache





Past Medical History


Past Medical History: Coronary Artery Disease (CAD), Chest Pain / Angina, COPD, 

CVA/TIA, Dementia, Eye Disorder, Hypertension, Myocardial Infarction (MI), 

Mitral Valve Prolapse (MVP), Osteoarthritis (OA)


Additional Past Medical History / Comment(s): MITRAL VALVE PROLAPSE, MAC 

DEGERATION, PAST PAROXYSMAL AFIB, CHRONIC BRONCHITIS, PNE X3, SHINGLE X2 IN 

1975, HIATAL HERNIA, INCONT OF URINE WEARS A BRIEF. port in right eye for mac 

degen (Study group), dual chamber pacemaker.


Last Myocardial Infarction Date:: UNK


History of Any Multi-Drug Resistant Organisms: None Reported


Past Surgical History: Adenoidectomy, Appendectomy, Cholecystectomy, Heart 

Catheterization, Pacemaker, Tonsillectomy


Additional Past Surgical History / Comment(s): CARDIAC ABLATION, RT KNEE 

ARTHROSCOPY, OVARIES REMOVED 1996,LT PINKY FINGER SX, ROBERT BUNIONECTOMY/HAMMER 

TOE SX, ROBERT CATARACTS, X3 ABD HERNIA SX.


Past Anesthesia/Blood Transfusion Reactions: Previous Problems w/ Anesthesia


Additional Past Anesthesia/Blood Transfusion Reaction / Comment(s): DIFFICULTY 

WAKING UP AFTER RT KNEE SX.   PAST BLOOD TRANSFUSION-NO REACTION


Type of Cardiac Device: Permanent Pacemaker


Device Placement Date:: 2009


Past Psychological History: No Psychological Hx Reported


Smoking Status: Former smoker


Past Alcohol Use History: Occasional


Past Drug Use History: None Reported





- Past Family History


  ** Father


Family Medical History: CVA/TIA, Diabetes Mellitus





  ** Mother


Family Medical History: Chest Pain / Angina





General Exam


Limitations: altered mental status


General appearance: alert, in no apparent distress


Head exam: Present: atraumatic, normocephalic


Eye exam: Present: normal appearance, PERRL, EOMI


ENT exam: Present: normal oropharynx


Neck exam: Present: normal inspection.  Absent: tenderness


Respiratory exam: Present: normal lung sounds bilaterally


Cardiovascular Exam: Present: regular rate, normal rhythm


  ** Expanded


Peripheral pulses: 2+: Dorsalis Pedis (R), Dorsalis Pedis (L)


GI/Abdominal exam: Present: soft.  Absent: tenderness


Extremities exam: Present: tenderness (Mild right knee tenderness.  Mild to 

moderate tenderness above the right ankle with some mild swelling.  Distally the

extremity is neurovascular intact.  No hip tenderness.)


Neurological exam: Present: alert.  Absent: motor sensory deficit


Psychiatric exam: Present: normal affect, normal mood


Skin exam: Present: normal color





Course


                                   Vital Signs











  06/05/23





  16:11


 


Temperature 97.6 F


 


Pulse Rate 70


 


Respiratory 16





Rate 


 


Blood Pressure 106/57


 


O2 Sat by Pulse 96





Oximetry 














Medical Decision Making





- Medical Decision Making





Was pt. sent in by a medical professional or institution (, PA, NP, urgent 

care, hospital, or nursing home...) When possible be specific


@  -No


Did you speak to anyone other than the patient for history (EMS, parent, family,

police, friend...)? What history was obtained from this source 


@  -Daughter did arrive and helps provide history including that patient states 

she had struck her head previously.  Patient had denied this to me otherwise


Did you review nursing and triage notes (agree or disagree)?  Why? 


@  -I reviewed and agree with nursing and triage notes


Were old charts reviewed (outside hosp., previous admission, EMS record, old 

EKG, old radiological studies, urgent care reports/EKG's, nursing home records)?

Report findings 


@  -No old charts were reviewed


Differential Diagnosis (chest pain, altered mental status, abdominal pain women,

abdominal pain men, vaginal bleeding, weakness, fever, dyspnea, syncope, 

headache, dizziness, GI bleed, back pain, seizure, CVA, palpatations, mental 

health)? 


@  -not applicable


EKG interpreted by me (3pts min.).


@  -As above


X-rays interpreted by me (1pt min.).


@  -X-ray right knee, right tib-fib, and right ankle do not reveal acute 

fracture


CT interpreted by me (1pt min.).


@  -Report reviewed


U/S interpreted by me (1pt. min.).


@  -None done


What testing was considered but not performed or refused? (CT, X-rays, U/S, 

labs)? Why?


@  -None


What meds were considered but not given or refused? Why?


@  -None


Did you discuss the management of the patient with other professionals 

(professionals i.e. , PA, NP, lab, RT, psych nurse, , , 

teacher, , )? Give summary


@  -No


Was smoking cessation discussed for >3mins.?


@  -No


Was critical care preformed (if so, how long)?


@  -No


Were there social determinants of health that impacted care today? How? 

(Homelessness, low income, unemployed, alcoholism, drug addiction, 

transportation, low edu. Level, literacy, decrease access to med. care, senior living, 

rehab)?


@  -No


Was there de-escalation of care discussed even if they declined (Discuss DNR or 

withdrawal of care, Hospice)? DNR status


@  -No


What co-morbidities impacted this encounter? (DM, HTN, Smoking, COPD, CAD, 

Cancer, CVA, ARF, Chemo, Hep., AIDS, mental health diagnosis, sleep apnea, 

morbid obesity)?


@  -None


Was patient admitted / discharged? Hospital course, mention meds given and 

route, prescriptions, significant lab abnormalities, going to OR and other 

pertinent info.


@  -Patient reevaluated and resting comfortably in bed without complaints.  

Patient and family updated.  They do refuse Tylenol and Ace wrap at this time 


Undiagnosed new problem with uncertain prognosis?


@  -No


Drug Therapy requiring intensive monitoring for toxicity (Heparin, Nitro, 

Insulin, Cardizem)?


@  -No


Were any procedures done?


@  -No


Diagnosis/symptom?


@  -Fall, leg sprain


Acute, or Chronic, or Acute on Chronic?


@  -, Acute


Uncomplicated (without systemic symptoms) or Complicated (systemic symptoms)?


@  -default


Side effects of treatment?


@  -No


Exacerbation, Progression, or Severe Exacerbation?


@  -No


Poses a threat to life or bodily function? How? (Chest pain, USA, MI, pneumonia,

PE, COPD, DKA, ARF, appy, cholecystitis, CVA, Diverticulitis, Homicidal, 

Suicidal, threat to staff... and all critical care pts)


@  -No





Disposition


Clinical Impression: 


 Fall, Leg sprain





Disposition: HOME SELF-CARE


Condition: Stable


Instructions (If sedation given, give patient instructions):  Fall Prevention 

for Older Adults (ED)


Additional Instructions: 


Ice to affected area.  Ace wrap as needed.  Please do follow-up to primary care 

physician in the next couple days for recheck.  Return for confusion, weakness, 

worsening or changing symptoms or any other concerns.


Is patient prescribed a controlled substance at d/c from ED?: No


Referrals: 


Car Rees DO [Primary Care Provider] - 1-2 days


Time of Disposition: 18:24

## 2023-06-05 NOTE — CT
EXAMINATION TYPE: CT brain wo con

CT DLP: 1080.4 mGycm, Automated exposure control for dose reduction was used.

 

DATE OF EXAM: 6/5/2023 5:26 PM

 

COMPARISON: 1/31/2023.

 

CLINICAL INDICATION:Female, 89 years old with history of fall

 

TECHNIQUE: 

Brain: Axial CT images of the brain were obtained with coronal and sagittal reformats created and rev
iewed.

Contrast used: None.

Oral contrast used: None.

 

FINDINGS:

 

Brain:

Extra-axial spaces: No abnormal extra-axial fluid collections.

Ventricular system: Dilatation in proportion to cerebral atrophy.

Cerebral parenchyma: Cerebral atrophy. No acute intraparenchymal hemorrhage or mass effect.  The gray
-white junction is well differentiated. Scattered hypoattenuating areas are seen within the white mat
ter.  

Cerebellum: Unremarkable.

Mass effect: No evidence of midline shift.

Intracranial vasculature: Atherosclerotic calcifications of the intracranial vessels.

Soft tissues: Normal.

Calvarium/osseous structures: No depressed skull fracture.

Paranasal sinuses and mastoid air cells: Mild scattered paranasal sinus disease.

Visualized orbits: Bilateral aphakia, high-density device seen within the right lobe as seen dating b
ack to 6/20/2021 and new from 2019.

 

IMPRESSION:

1. No acute intracranial process.

 

2. Nonspecific white matter changes, likely secondary to chronic small vessel ischemic disease.

## 2023-06-05 NOTE — XR
EXAMINATION TYPE: XR tibia fibula RT, XR knee complete RT, XR ankle complete RT

 

DATE OF EXAM: 6/5/2023 5:16 PM

 

INDICATION: 

Patient age:Female;  89 years old; 

Reason for study: fall; 

 

COMPARISON: None

 

TECHNIQUE: The right tibia/fibula was examined in AP and lateral projections.  

Right ankle in frontal lateral and oblique views.

Right knee in frontal lateral oblique views.

 

FINDINGS: No evidence of any acute osseous pathology, joint dislocation . Diffuse soft tissue edema t
hroughout the leg. There is osteophyte formation of the tibial plateau patella and femoral condyles. 
Atherosclerosis of the arterial vasculature. Chondrocalcinosis noted. Calcaneal plantar spurring is p
resent.

 

IMPRESSION: 

1.  No evidence of acute fracture. If there remains concern consider CT.

2.  Diffuse soft tissue swelling of the leg. 

3.  Moderate to severe degeneration osteoporosis changes of the right knee.

## 2023-07-25 ENCOUNTER — HOSPITAL ENCOUNTER (EMERGENCY)
Dept: HOSPITAL 47 - EC | Age: 88
Discharge: HOME | End: 2023-07-25
Payer: MEDICARE

## 2023-07-25 VITALS
RESPIRATION RATE: 18 BRPM | SYSTOLIC BLOOD PRESSURE: 125 MMHG | TEMPERATURE: 98.5 F | DIASTOLIC BLOOD PRESSURE: 84 MMHG | HEART RATE: 82 BPM

## 2023-07-25 DIAGNOSIS — Z88.1: ICD-10-CM

## 2023-07-25 DIAGNOSIS — J44.9: ICD-10-CM
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
## 2023-07-25 DIAGNOSIS — Z87.891: ICD-10-CM

## 2023-07-25 DIAGNOSIS — I10: ICD-10-CM

## 2023-07-25 DIAGNOSIS — Z86.73: ICD-10-CM

## 2023-07-25 DIAGNOSIS — I25.2: ICD-10-CM

## 2023-07-25 DIAGNOSIS — Z88.5: ICD-10-CM

## 2023-07-25 DIAGNOSIS — I25.10: ICD-10-CM

## 2023-07-25 DIAGNOSIS — Z79.899: ICD-10-CM

## 2023-07-25 DIAGNOSIS — Z79.82: ICD-10-CM

## 2023-07-25 DIAGNOSIS — Z88.2: ICD-10-CM

## 2023-07-25 DIAGNOSIS — Z95.0: ICD-10-CM

## 2023-07-25 DIAGNOSIS — S00.03XA: Primary | ICD-10-CM

## 2023-07-25 DIAGNOSIS — Z90.49: ICD-10-CM

## 2023-07-25 PROCEDURE — 99285 EMERGENCY DEPT VISIT HI MDM: CPT

## 2023-07-25 PROCEDURE — 72125 CT NECK SPINE W/O DYE: CPT

## 2023-07-25 PROCEDURE — 70450 CT HEAD/BRAIN W/O DYE: CPT

## 2023-07-25 NOTE — CT
The EXAMINATION TYPE: CT brain suzy wo con

 

DATE OF EXAM: 7/25/2023

 

COMPARISON: 6/5/2023

 

HISTORY: pain after fall

 

CT DLP: 1343.3 mGycm

 

Unenhanced CT of the brain was performed.  

 

The ventricles, basal cisterns and sulci overlying the cerebral convexities demonstrate mild enlargem
ent.  

 

There is no evidence for intracranial hemorrhage or sulcal effacement.  There is decreased attenuatio
n about the periventricular white matter and deep white matter of both cerebral hemispheres, compatib
le with chronic small vessel ischemia.  

 

No mass effects are seen.  

If symptoms persist consider MRI.  

 

Osseous calvarium is intact. Small area of scalp hematoma posterior left parietal-occipital region.

 

IMPRESSION:

 

1.  Age related atrophic and chronic small vessel ischemic change without acute intracranial process 
seen at this time.

 

CT Cervical Spine:

 

Unenhanced CT of the cervical spine was performed with bone and soft tissue window settings submitted
.  Coronal and sagittal reconstruction is obtained.  

 

There is normal alignment and prevertebral soft tissues. No evidence for acute cervical fracture .   
Scattered degenerative disc disease and spondylosis. Biapical scarring.   

 

IMPRESSION:

 

1.  No evidence for acute fracture or subluxation of the cervical spine.

## 2023-07-25 NOTE — ED
Fall HPI





- General


Chief Complaint: Fall


Stated Complaint: Fall,Blood Thinners


Time Seen by Provider: 23 19:03


Source: family, EMS


Mode of arrival: EMS


Limitations: altered mental status





- History of Present Illness


Initial Comments: 





This patient is an 89-year-old woman brought to have evaluation after she fell. 

The patient reportedly had tripped and fallen backwards striking head against a 

door jamb.  No loss consciousness.  The patient does give some history but there

is reported history of dementia and patient's daughter provides most of the 

history.  She was present.  Patient is denying pains.


MD Complaint: fall


Onset/Timin


-: hour(s)


Fall From: standing


Fall Witnessed: no


Place Fall Occurred: home


Loss of Consciousness: none


Prolonged Down Time?: no


Location: head





- Related Data


                                Home Medications











 Medication  Instructions  Recorded  Confirmed


 


Vitamin B Complex 1 cap PO DAILY 17


 


Atorvastatin [Lipitor] 20 mg PO DAILY 19


 


Calcium Carbonate/Vitamin D3 1 tab PO DAILY 19





[Calcium 600-Vit D3 20 Mcg (800   





Iu)]   


 


Spironolactone [Aldactone] 12.5 mg PO DAILY 19


 


Omeprazole 20 mg PO DAILY 22


 


Furosemide [Lasix] 20 mg PO DAILY 22


 


Potassium Chloride ER [K-Dur 10] 10 meq PO DAILY 22


 


Aspirin EC [Ecotrin Low Dose] 81 mg PO DAILY 23


 


Cholecalciferol [Vitamin D3 (25 50 mcg PO DAILY 23





Mcg = 1000 Iu)]   


 


Meclizine HCl 25 mg PO TID PRN 23


 


QUEtiapine FUMARATE [SEROquel] 25 mg PO HS 23








                                  Previous Rx's











 Medication  Instructions  Recorded


 


Apixaban [Eliquis] 2.5 mg PO BID #60 tab 07/15/22


 


Cephalexin [Keflex] 500 mg PO Q12HR 7 Days #14 cap 23


 


Amoxic-Pot Clav 875-125Mg 1 tab PO Q12HR #14 tablet 23





[Augmentin 875-125]  











                                    Allergies











Allergy/AdvReac Type Severity Reaction Status Date / Time


 


cetirizine [From Zyrtec] Allergy  Anaphylaxis Verified 23 20:40


 


Sulfa (Sulfonamide Allergy  Unknown Verified 23 20:40





Antibiotics)     


 


clindamycin AdvReac Severe Chest Pain Verified 23 20:40





   Followed  





   by Heart  





   Attack  


 


narcotics AdvReac Severe Can not Uncoded 23 20:40





   tolerate  














Review of Systems


ROS Statement: 


Those systems with pertinent positive or pertinent negative responses have been 

documented in the HPI.





ROS Other: All systems not noted in ROS Statement are negative.


Constitutional: Denies: fever, chills


Respiratory: Denies: cough, dyspnea


Cardiovascular: Denies: chest pain, syncope


Gastrointestinal: Denies: abdominal pain, vomiting


Musculoskeletal: Denies: back pain


Skin: Denies: rash


Neurological: Reports: headache.  Denies: weakness





Past Medical History


Past Medical History: Coronary Artery Disease (CAD), Chest Pain / Angina, COPD, 

CVA/TIA, Dementia, Eye Disorder, Hypertension, Myocardial Infarction (MI), 

Mitral Valve Prolapse (MVP), Osteoarthritis (OA)


Additional Past Medical History / Comment(s): MITRAL VALVE PROLAPSE, MAC 

DEGERATION, PAST PAROXYSMAL AFIB, CHRONIC BRONCHITIS, PNE X3, SHINGLE X2 IN 

, HIATAL HERNIA, INCONT OF URINE WEARS A BRIEF. port in right eye for mac 

degen (Study group), dual chamber pacemaker.


Last Myocardial Infarction Date:: UNK


History of Any Multi-Drug Resistant Organisms: None Reported


Past Surgical History: Adenoidectomy, Appendectomy, Cholecystectomy, Heart 

Catheterization, Pacemaker, Tonsillectomy


Additional Past Surgical History / Comment(s): CARDIAC ABLATION, RT KNEE 

ARTHROSCOPY, OVARIES REMOVED ,LT PINKY FINGER SX, ROBERT BUNIONECTOMY/HAMMER 

TOE SX, ROBERT CATARACTS, X3 ABD HERNIA SX.


Past Anesthesia/Blood Transfusion Reactions: Previous Problems w/ Anesthesia


Additional Past Anesthesia/Blood Transfusion Reaction / Comment(s): DIFFICULTY W

AKING UP AFTER RT KNEE SX.   PAST BLOOD TRANSFUSION-NO REACTION


Type of Cardiac Device: Permanent Pacemaker


Device Placement Date:: 


Past Psychological History: No Psychological Hx Reported


Smoking Status: Former smoker


Past Alcohol Use History: Occasional


Past Drug Use History: None Reported





- Past Family History


  ** Father


Family Medical History: CVA/TIA, Diabetes Mellitus





  ** Mother


Family Medical History: Chest Pain / Angina





General Exam


Limitations: altered mental status


General appearance: alert, in no apparent distress


Head exam: Present: other (Hematoma left occipital area).  Absent: normocephalic


Eye exam: Present: normal appearance, PERRL, EOMI.  Absent: scleral icterus, 

conjunctival injection, nystagmus


ENT exam: Present: mucous membranes dry


Neck exam: Present: normal inspection, other (Cervical collar present).  Absent:

tenderness


Respiratory exam: Present: normal lung sounds bilaterally.  Absent: respiratory 

distress, wheezes, rales, rhonchi, stridor, chest wall tenderness


Cardiovascular Exam: Present: regular rate, normal rhythm, normal heart sounds. 

Absent: systolic murmur, diastolic murmur, rubs, gallop


GI/Abdominal exam: Present: soft.  Absent: distended, tenderness, guarding, 

rebound


Extremities exam: Present: normal inspection, normal capillary refill.  Absent: 

pedal edema, calf tenderness


Back exam: Present: normal inspection.  Absent: vertebral tenderness


Neurological exam: Present: alert, CN II-XII intact.  Absent: oriented X3, motor

sensory deficit


Skin exam: Present: warm, dry, intact, normal color.  Absent: rash





Course


                                   Vital Signs











  23





  18:59 19:13 19:30


 


Temperature 98.6 F  


 


Pulse Rate 70 76 70


 


Respiratory 18 20 18





Rate   


 


Blood Pressure 111/68 111/68 107/73


 


O2 Sat by Pulse 96 97 99





Oximetry   














  23





  20:00 21:39


 


Temperature  98.5 F


 


Pulse Rate 71 82


 


Respiratory 20 18





Rate  


 


Blood Pressure 102/64 125/84


 


O2 Sat by Pulse 97 97





Oximetry  














Medical Decision Making





- Medical Decision Making





Patient is a 89-year-old woman with fall taking eliquis





This patient had CT of the brain which I interpreted as being negative for acute

bony injury or intracranial hemorrhage.





Was pt. sent in by a medical professional or institution (, PA, NP, urgent 

care, hospital, or nursing home...) When possible be specific


@  -[No]


Did you speak to anyone other than the patient for history (EMS, parent, family,

 police, friend...)? What history was obtained from this source 


@  -[The patient's daughter gave additional history


Did you review nursing and triage notes (agree or disagree)?  Why? 


@  -[I reviewed and agree with nursing and triage notes]


Were old charts reviewed (outside hosp., previous admission, EMS record, old 

EKG, old radiological studies, urgent care reports/EKG's, nursing home records)?

Report findings 


@  -[old charts were reviewed]


Differential Diagnosis (chest pain, altered mental status, abdominal pain women,

abdominal pain men, vaginal bleeding, weakness, fever, dyspnea, syncope, 

headache, dizziness, GI bleed, back pain, seizure, CVA, palpatations, mental 

health, musculoskeletal)? 


@  -[Differential diagnosis for the fall including scalp contusion, cranial 

fracture, intracranial hemorrhage, subdural hemorrhage, concussion, cervical 

fracture, cervical strain amongst other conditions


EKG interpreted by me (3pts min.).


@  -[


X-rays interpreted by me (1pt min.).


@  -[None done]


CT interpreted by me (1pt min.).


@  -[As above


U/S interpreted by me (1pt. min.).


@  -[None done]


What testing was considered but not performed or refused? (CT, X-rays, U/S, 

labs)? Why?


@  -[None]


What meds were considered but not given or refused? Why?


@  -[None]


Did you discuss the management of the patient with other professionals 

(professionals i.e. , PA, NP, lab, RT, psych nurse, , , 

teacher, , )? Give summary


@  -[No]


Was smoking cessation discussed for >3mins.?


@  -[No]


Was critical care preformed (if so, how long)?


@  -[No]


Were there social determinants of health that impacted care today? How? 

(Homelessness, low income, unemployed, alcoholism, drug addiction, transport

ation, low edu. Level, literacy, decrease access to med. care, shelter, rehab)?


@  -[No]


Was there de-escalation of care discussed even if they declined (Discuss DNR or 

withdrawal of care, Hospice)? DNR status


@  -[No]


What co-morbidities impacted this encounter? (DM, HTN, Smoking, COPD, CAD, 

Cancer, CVA, ARF, Chemo, Hep., AIDS, mental health diagnosis, sleep apnea, 

morbid obesity)?


@  -[None]


Was patient admitted / discharged? Hospital course, mention meds given and 

route, prescriptions, significant lab abnormalities, going to OR and other 

pertinent info.


@  -[Patient is discharged


Undiagnosed new problem with uncertain prognosis?


@  -[No]


Drug Therapy requiring intensive monitoring for toxicity (Heparin, Nitro, 

Insulin, Cardizem)?


@  -[No]


Were any procedures done?


@  -[No]


Diagnosis/symptom?


@  -[Scalp hematoma


Acute, or Chronic, or Acute on Chronic?


@  -[Acute


Uncomplicated (without systemic symptoms) or Complicated (systemic symptoms)?


@  -[Uncomplicated


Side effects of treatment?


@  -[No]


Exacerbation, Progression, or Severe Exacerbation?


@  -[No]


Poses a threat to life or bodily function? How? (Chest pain, USA, MI, pneumonia,

 PE, COPD, DKA, ARF, appy, cholecystitis, CVA, Diverticulitis, Homicidal, 

Suicidal, threat to staff... and all critical care pts)


@  -[No]





Disposition


Clinical Impression: 


 Fall





Disposition: HOME SELF-CARE


Condition: Good


Instructions (If sedation given, give patient instructions):  Fall Prevention 

for Older Adults (ED)


Is patient prescribed a controlled substance at d/c from ED?: No


Referrals: 


Car Rees DO [Primary Care Provider] - 1-2 days

## 2023-07-29 ENCOUNTER — HOSPITAL ENCOUNTER (EMERGENCY)
Dept: HOSPITAL 47 - EC | Age: 88
LOS: 1 days | Discharge: HOME | End: 2023-07-30
Payer: MEDICARE

## 2023-07-29 VITALS — RESPIRATION RATE: 16 BRPM

## 2023-07-29 DIAGNOSIS — M19.90: ICD-10-CM
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
## 2023-07-29 DIAGNOSIS — I25.2: ICD-10-CM

## 2023-07-29 DIAGNOSIS — Z88.6: ICD-10-CM
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
## 2023-07-29 DIAGNOSIS — Z79.82: ICD-10-CM

## 2023-07-29 DIAGNOSIS — J44.9: ICD-10-CM

## 2023-07-29 DIAGNOSIS — N39.0: ICD-10-CM

## 2023-07-29 DIAGNOSIS — R53.1: ICD-10-CM

## 2023-07-29 DIAGNOSIS — Z79.1: ICD-10-CM

## 2023-07-29 DIAGNOSIS — Z79.899: ICD-10-CM

## 2023-07-29 DIAGNOSIS — Z88.2: ICD-10-CM

## 2023-07-29 DIAGNOSIS — M16.12: Primary | ICD-10-CM

## 2023-07-29 DIAGNOSIS — Z88.8: ICD-10-CM

## 2023-07-29 DIAGNOSIS — I10: ICD-10-CM

## 2023-07-29 DIAGNOSIS — I25.10: ICD-10-CM

## 2023-07-29 DIAGNOSIS — Z88.1: ICD-10-CM

## 2023-07-29 DIAGNOSIS — F03.C0: ICD-10-CM

## 2023-07-29 DIAGNOSIS — Z88.5: ICD-10-CM

## 2023-07-29 DIAGNOSIS — Z87.891: ICD-10-CM

## 2023-07-29 LAB
ALBUMIN SERPL-MCNC: 3.3 G/DL (ref 3.5–5)
ALP SERPL-CCNC: 87 U/L (ref 38–126)
ALT SERPL-CCNC: 25 U/L (ref 4–34)
ANION GAP SERPL CALC-SCNC: 8 MMOL/L
APTT BLD: 25.2 SEC (ref 22–30)
AST SERPL-CCNC: 32 U/L (ref 14–36)
BASOPHILS # BLD AUTO: 0 K/UL (ref 0–0.2)
BASOPHILS NFR BLD AUTO: 1 %
BUN SERPL-SCNC: 30 MG/DL (ref 7–17)
CALCIUM SPEC-MCNC: 8.8 MG/DL (ref 8.4–10.2)
CHLORIDE SERPL-SCNC: 103 MMOL/L (ref 98–107)
CO2 SERPL-SCNC: 26 MMOL/L (ref 22–30)
EOSINOPHIL # BLD AUTO: 0 K/UL (ref 0–0.7)
EOSINOPHIL NFR BLD AUTO: 0 %
ERYTHROCYTE [DISTWIDTH] IN BLOOD BY AUTOMATED COUNT: 3.67 M/UL (ref 3.8–5.4)
ERYTHROCYTE [DISTWIDTH] IN BLOOD: 13.7 % (ref 11.5–15.5)
GLUCOSE SERPL-MCNC: 113 MG/DL (ref 74–99)
HCT VFR BLD AUTO: 33.2 % (ref 34–46)
HGB BLD-MCNC: 11.4 GM/DL (ref 11.4–16)
INR PPP: 1 (ref ?–1.2)
LYMPHOCYTES # SPEC AUTO: 0.6 K/UL (ref 1–4.8)
LYMPHOCYTES NFR SPEC AUTO: 8 %
MAGNESIUM SPEC-SCNC: 2 MG/DL (ref 1.6–2.3)
MCH RBC QN AUTO: 31 PG (ref 25–35)
MCHC RBC AUTO-ENTMCNC: 34.3 G/DL (ref 31–37)
MCV RBC AUTO: 90.5 FL (ref 80–100)
MONOCYTES # BLD AUTO: 0.4 K/UL (ref 0–1)
MONOCYTES NFR BLD AUTO: 5 %
NEUTROPHILS # BLD AUTO: 6.3 K/UL (ref 1.3–7.7)
NEUTROPHILS NFR BLD AUTO: 85 %
PLATELET # BLD AUTO: 160 K/UL (ref 150–450)
POTASSIUM SERPL-SCNC: 3.9 MMOL/L (ref 3.5–5.1)
PROT SERPL-MCNC: 6.1 G/DL (ref 6.3–8.2)
PT BLD: 10.2 SEC (ref 9–12)
SODIUM SERPL-SCNC: 137 MMOL/L (ref 137–145)
WBC # BLD AUTO: 7.4 K/UL (ref 3.8–10.6)

## 2023-07-29 PROCEDURE — 84443 ASSAY THYROID STIM HORMONE: CPT

## 2023-07-29 PROCEDURE — 84100 ASSAY OF PHOSPHORUS: CPT

## 2023-07-29 PROCEDURE — 96360 HYDRATION IV INFUSION INIT: CPT

## 2023-07-29 PROCEDURE — 71045 X-RAY EXAM CHEST 1 VIEW: CPT

## 2023-07-29 PROCEDURE — 36415 COLL VENOUS BLD VENIPUNCTURE: CPT

## 2023-07-29 PROCEDURE — 85610 PROTHROMBIN TIME: CPT

## 2023-07-29 PROCEDURE — 83735 ASSAY OF MAGNESIUM: CPT

## 2023-07-29 PROCEDURE — 84484 ASSAY OF TROPONIN QUANT: CPT

## 2023-07-29 PROCEDURE — 70450 CT HEAD/BRAIN W/O DYE: CPT

## 2023-07-29 PROCEDURE — 72170 X-RAY EXAM OF PELVIS: CPT

## 2023-07-29 PROCEDURE — 85730 THROMBOPLASTIN TIME PARTIAL: CPT

## 2023-07-29 PROCEDURE — 99285 EMERGENCY DEPT VISIT HI MDM: CPT

## 2023-07-29 PROCEDURE — 83605 ASSAY OF LACTIC ACID: CPT

## 2023-07-29 PROCEDURE — 80053 COMPREHEN METABOLIC PANEL: CPT

## 2023-07-29 PROCEDURE — 93005 ELECTROCARDIOGRAM TRACING: CPT

## 2023-07-29 PROCEDURE — 85025 COMPLETE CBC W/AUTO DIFF WBC: CPT

## 2023-07-29 PROCEDURE — 81001 URINALYSIS AUTO W/SCOPE: CPT

## 2023-07-29 PROCEDURE — 72125 CT NECK SPINE W/O DYE: CPT

## 2023-07-29 NOTE — ED
Fall HPI





- General


Chief Complaint: Fall


Stated Complaint: Fall


Time Seen by Provider: 07/29/23 21:17


Source: patient, EMS, RN notes reviewed, old records reviewed


Mode of arrival: EMS


Limitations: no limitations





- History of Present Illness


Initial Comments: 





This is an 89-year-old female to the emergency department for evaluation.  

Patient presents today for evaluation regards to falls.  Multiple recent 

mechanical falls patient suffers from severe dementia unsure if he hit her head 

but patient is on blood thinners.  Patient has multiple falls this past week.  

No travel history no sick contacts no other complaints.  Family denies any real 

significant complaints otherwise as well


MD Complaint: fall


-: hour(s)


Fall From: standing


When Fall Occurred: 1 hour PTA


Fall Witnessed: yes, by family


Place Fall Occurred: home


Loss of Consciousness: none


Prolonged Down Time?: no


Symptoms Prior to Fall: none


Severity: mild


Severity scale (1-10): 2


Context: tripped/slipped


Associated Symptoms: denies





- Related Data


                                Home Medications











 Medication  Instructions  Recorded  Confirmed


 


Vitamin B Complex 1 cap PO DAILY 06/20/17 01/31/23


 


Atorvastatin [Lipitor] 20 mg PO DAILY 07/20/19 01/31/23


 


Calcium Carbonate/Vitamin D3 1 tab PO DAILY 07/20/19 01/31/23





[Calcium 600-Vit D3 20 Mcg (800   





Iu)]   


 


Spironolactone [Aldactone] 12.5 mg PO DAILY 07/20/19 01/31/23


 


Omeprazole 20 mg PO DAILY 01/01/22 01/31/23


 


Furosemide [Lasix] 20 mg PO DAILY 07/14/22 01/31/23


 


Potassium Chloride ER [K-Dur 10] 10 meq PO DAILY 07/14/22 01/31/23


 


Aspirin EC [Ecotrin Low Dose] 81 mg PO DAILY 01/28/23 01/31/23


 


Cholecalciferol [Vitamin D3 (25 50 mcg PO DAILY 01/28/23 01/31/23





Mcg = 1000 Iu)]   


 


Meclizine HCl 25 mg PO TID PRN 01/28/23 01/31/23


 


QUEtiapine FUMARATE [SEROquel] 25 mg PO HS 01/28/23 01/31/23








                                  Previous Rx's











 Medication  Instructions  Recorded


 


Apixaban [Eliquis] 2.5 mg PO BID #60 tab 07/15/22


 


Cephalexin [Keflex] 500 mg PO Q12HR 7 Days #14 cap 01/28/23


 


Amoxic-Pot Clav 875-125Mg 1 tab PO Q12HR #14 tablet 07/30/23





[Augmentin 875-125]  











                                    Allergies











Allergy/AdvReac Type Severity Reaction Status Date / Time


 


cetirizine [From Zyrtec] Allergy  Anaphylaxis Verified 07/29/23 20:40


 


Sulfa (Sulfonamide Allergy  Unknown Verified 07/29/23 20:40





Antibiotics)     


 


clindamycin AdvReac Severe Chest Pain Verified 07/29/23 20:40





   Followed  





   by Heart  





   Attack  


 


narcotics AdvReac Severe Can not Uncoded 07/29/23 20:40





   tolerate  














Review of Systems


ROS Statement: 


Those systems with pertinent positive or pertinent negative responses have been 

documented in the HPI.





ROS Other: All systems not noted in ROS Statement are negative.





Past Medical History


Past Medical History: Coronary Artery Disease (CAD), Chest Pain / Angina, COPD, 

CVA/TIA, Dementia, Eye Disorder, Hypertension, Myocardial Infarction (MI), 

Mitral Valve Prolapse (MVP), Osteoarthritis (OA)


Additional Past Medical History / Comment(s): MITRAL VALVE PROLAPSE, MAC 

DEGERATION, PAST PAROXYSMAL AFIB, CHRONIC BRONCHITIS, PNE X3, SHINGLE X2 IN 

1975, HIATAL HERNIA, INCONT OF URINE WEARS A BRIEF. port in right eye for mac 

degen (Study group), dual chamber pacemaker.


Last Myocardial Infarction Date:: UNK


History of Any Multi-Drug Resistant Organisms: None Reported


Past Surgical History: Adenoidectomy, Appendectomy, Cholecystectomy, Heart Caity

terization, Pacemaker, Tonsillectomy


Additional Past Surgical History / Comment(s): CARDIAC ABLATION, RT KNEE 

ARTHROSCOPY, OVARIES REMOVED 1996,LT PINKY FINGER SX, ROBERT BUNIONECTOMY/HAMMER 

TOE SX, ROBERT CATARACTS, X3 ABD HERNIA SX.


Past Anesthesia/Blood Transfusion Reactions: Previous Problems w/ Anesthesia


Additional Past Anesthesia/Blood Transfusion Reaction / Comment(s): DIFFICULTY 

WAKING UP AFTER RT KNEE SX.   PAST BLOOD TRANSFUSION-NO REACTION


Type of Cardiac Device: Permanent Pacemaker


Device Placement Date:: 2009


Past Psychological History: No Psychological Hx Reported


Smoking Status: Former smoker


Past Alcohol Use History: Occasional


Past Drug Use History: None Reported





- Past Family History


  ** Father


Family Medical History: CVA/TIA, Diabetes Mellitus





  ** Mother


Family Medical History: Chest Pain / Angina





General Exam


Limitations: no limitations


General appearance: alert, in no apparent distress


Head exam: Present: atraumatic, normocephalic, normal inspection


Eye exam: Present: normal appearance, PERRL, EOMI.  Absent: scleral icterus, 

conjunctival injection, periorbital swelling


ENT exam: Present: normal exam, mucous membranes moist


Neck exam: Present: normal inspection.  Absent: tenderness, meningismus, l

ymphadenopathy


Respiratory exam: Present: normal lung sounds bilaterally.  Absent: respiratory 

distress, wheezes, rales, rhonchi, stridor


Cardiovascular Exam: Present: regular rate, normal rhythm, normal heart sounds. 

Absent: systolic murmur, diastolic murmur, rubs, gallop, clicks


GI/Abdominal exam: Present: soft, normal bowel sounds.  Absent: distended, 

tenderness, guarding, rebound, rigid


Extremities exam: Present: normal inspection, full ROM, normal capillary refill.

 Absent: tenderness, pedal edema, joint swelling, calf tenderness


Back exam: Present: normal inspection


Neurological exam: Present: alert, oriented X3, CN II-XII intact


Psychiatric exam: Present: normal affect, normal mood


Skin exam: Present: warm, dry, intact, normal color.  Absent: rash





Course


                                   Vital Signs











  07/29/23 07/29/23 07/30/23





  20:33 22:43 02:06


 


Pulse Rate 71 76 72


 


Respiratory 18 16 16





Rate   


 


Blood Pressure 123/94 121/91 112/72


 


O2 Sat by Pulse 96  96





Oximetry   














- Reevaluation(s)


Reevaluation #1: 





07/30/23 00:38


Medical records reviewed


Reevaluation #2: 





07/30/23 00:38


Patient symptoms remain unchanged no syncopal event here in the ER


Falls do appear to be mechanical


Patient is without complaint throughout ER stay


Reevaluation #3: 





07/30/23 00:38


Patient informed of results and questions answered


Reevaluation #4: 





07/30/23 00:38


Was pt. sent in by a medical professional or institution ([facility] Melendrez, NP, urgent 

care, hospital, or nursing home...) When possible be specific


@  -no


Did you speak to anyone other than the patient for history (EMS, parent, family,

police, friend...)? What history was obtained from this source 


@  -no


Did you review nursing and triage notes (agree or disagree)?  Why? 


@  -agree


Are old charts reviewed (outside hosp., previous admission, EMS record, old EKG,

old radiological studies, urgent care reports/EKG's, nursing home records)? 

Report findings 


@  -yes


Differential Diagnosis (chest pain, altered mental status, abdominal pain women,

abdominal pain men, vaginal bleeding, weakness, fever, dyspnea, syncope, 

headache, dizziness, GI bleed, back pain, seizure, CVA, palpatations, mental 

health, musculoskeletal)? 


@  -prior


EKG interpreted by me (3pts min.).


@  -no


X-rays interpreted by me (1pt min.).


@  -yes


CT interpreted by me (1pt min.).


@  -yes


U/S interpreted by me (1pt. min.).


@  -no


What testing was considered but not performed or refused? (CT, X-rays, U/S, 

labs)? Why?


@  -none


What meds were considered but not given or refused? Why?


@  -none


Did you discuss the management of the patient with other professionals 

(professionals i.e. , PA, NP, lab, RT, psych nurse, , , 

teacher, , )? Give summary


@  -no


Was smoking cessation discussed for >3mins.?


@  -no


Was critical care preformed (if so, how long)?


@  -no


Were there social determinants of health that impacted care today? How? 

(Homelessness, low income, unemployed, alcoholism, drug addiction, 

transportation, low edu. Level, literacy, decrease access to med. care, snf, 

rehab)?


@  -none


Was there de-escalation of care discussed even if they declined (Discuss DNR or 

withdrawal of care, Hospice)? DNR status


@  -no


What co-morbidities impacted this encounter? (DM, HTN, Smoking, COPD, CAD, 

Cancer, CVA, ARF, Chemo, Hep., AIDS, mental health diagnosis, sleep apnea, 

morbid obesity)?


@  -none


Was patient admitted / discharged? Hospital course, mention meds given and 

route, prescriptions, significant lab abnormalities, going to OR and other 

pertinent info.


@  - 89 female to the emergency department for evaluation.  Patient presents 

today for evaluation regards to falls mechanical falls and weakness.  Urinalysis

is positive for urinary tract infection, Lab values normal x-rays are normal 

patient can be discharged home


Discharge


Undiagnosed new problem with uncertain prognosis?


@  -no


Drug Therapy requiring intensive monitoring for toxicity (Heparin, Nitro, 

Insulin, Cardizem)?


@  -no


Were any procedures done?


@  -no


Diagnosis/symptom?


@  -Falls, weakness, UTI


Acute, or Chronic, or Acute on Chronic?


@  -Acute


Uncomplicated (without systemic symptoms) or Complicated (systemic symptoms)?


@  -Complicated


Side effects of treatment?


@  -no


Exacerbation, Progression, or Severe Exacerbation?


@  -exacerbation


Poses a threat to life or bodily function? How? (Chest pain, USA, MI, pneumonia,

PE, COPD, DKA, ARF, appy, cholecystitis, CVA, Diverticulitis, Homicidal, Suicid

al, threat to staff... and all critical care pts)


@  -yes significant fall with dementia, UTI and extreme of age





Reevaluation #5: 





07/30/23 00:38


Differential Weakness:


Hypoglycemia, shock, sepsis, hyponatremia, anemia, infection, MI, ETOH, adverse 

medicine reaction, overdose, stroke, this is not meant to be an all-inclusive 

list.





Medical Decision Making





- Medical Decision Making





89 female to the emergency department for evaluation.  Patient presents today 

for evaluation regards to falls mechanical falls and weakness.  Urinalysis is 

positive for urinary tract infection will place on antibiotics, Lab values 

normal x-rays are normal patient can be discharged home





- Lab Data


Result diagrams: 


                                 07/29/23 21:55





                                 07/29/23 21:55


                                   Lab Results











  07/29/23 07/29/23 07/29/23 Range/Units





  21:55 21:55 21:55 


 


WBC  7.4    (3.8-10.6)  k/uL


 


RBC  3.67 L    (3.80-5.40)  m/uL


 


Hgb  11.4    (11.4-16.0)  gm/dL


 


Hct  33.2 L    (34.0-46.0)  %


 


MCV  90.5    (80.0-100.0)  fL


 


MCH  31.0    (25.0-35.0)  pg


 


MCHC  34.3    (31.0-37.0)  g/dL


 


RDW  13.7    (11.5-15.5)  %


 


Plt Count  160    (150-450)  k/uL


 


MPV  8.6    


 


Neutrophils %  85    %


 


Lymphocytes %  8    %


 


Monocytes %  5    %


 


Eosinophils %  0    %


 


Basophils %  1    %


 


Neutrophils #  6.3    (1.3-7.7)  k/uL


 


Lymphocytes #  0.6 L    (1.0-4.8)  k/uL


 


Monocytes #  0.4    (0-1.0)  k/uL


 


Eosinophils #  0.0    (0-0.7)  k/uL


 


Basophils #  0.0    (0-0.2)  k/uL


 


PT   10.2   (9.0-12.0)  sec


 


INR   1.0   (<1.2)  


 


APTT   25.2   (22.0-30.0)  sec


 


Sodium    137  (137-145)  mmol/L


 


Potassium    3.9  (3.5-5.1)  mmol/L


 


Chloride    103  ()  mmol/L


 


Carbon Dioxide    26  (22-30)  mmol/L


 


Anion Gap    8  mmol/L


 


BUN    30 H  (7-17)  mg/dL


 


Creatinine    1.32 H  (0.52-1.04)  mg/dL


 


Est GFR (CKD-EPI)AfAm    41  (>60 ml/min/1.73 sqM)  


 


Est GFR (CKD-EPI)NonAf    36  (>60 ml/min/1.73 sqM)  


 


Glucose    113 H  (74-99)  mg/dL


 


Plasma Lactic Acid Kristian     (0.7-2.0)  mmol/L


 


Calcium    8.8  (8.4-10.2)  mg/dL


 


Phosphorus    2.9  (2.5-4.5)  mg/dL


 


Magnesium    2.0  (1.6-2.3)  mg/dL


 


Total Bilirubin    1.3  (0.2-1.3)  mg/dL


 


AST    32  (14-36)  U/L


 


ALT    25  (4-34)  U/L


 


Alkaline Phosphatase    87  ()  U/L


 


Troponin I     (0.000-0.034)  ng/mL


 


Total Protein    6.1 L  (6.3-8.2)  g/dL


 


Albumin    3.3 L  (3.5-5.0)  g/dL


 


TSH    3.550  (0.465-4.680)  mIU/L


 


Urine Color     


 


Urine Appearance     (Clear)  


 


Urine pH     (5.0-8.0)  


 


Ur Specific Gravity     (1.001-1.035)  


 


Urine Protein     (Negative)  


 


Urine Glucose (UA)     (Negative)  


 


Urine Ketones     (Negative)  


 


Urine Blood     (Negative)  


 


Urine Nitrite     (Negative)  


 


Urine Bilirubin     (Negative)  


 


Urine Urobilinogen     (<2.0)  mg/dL


 


Ur Leukocyte Esterase     (Negative)  


 


Urine RBC     (0-5)  /hpf


 


Urine WBC     (0-5)  /hpf


 


Ur Squamous Epith Cells     (0-4)  /hpf


 


Urine Bacteria     (None)  /hpf














  07/29/23 07/29/23 07/30/23 Range/Units





  21:55 21:55 00:55 


 


WBC     (3.8-10.6)  k/uL


 


RBC     (3.80-5.40)  m/uL


 


Hgb     (11.4-16.0)  gm/dL


 


Hct     (34.0-46.0)  %


 


MCV     (80.0-100.0)  fL


 


MCH     (25.0-35.0)  pg


 


MCHC     (31.0-37.0)  g/dL


 


RDW     (11.5-15.5)  %


 


Plt Count     (150-450)  k/uL


 


MPV     


 


Neutrophils %     %


 


Lymphocytes %     %


 


Monocytes %     %


 


Eosinophils %     %


 


Basophils %     %


 


Neutrophils #     (1.3-7.7)  k/uL


 


Lymphocytes #     (1.0-4.8)  k/uL


 


Monocytes #     (0-1.0)  k/uL


 


Eosinophils #     (0-0.7)  k/uL


 


Basophils #     (0-0.2)  k/uL


 


PT     (9.0-12.0)  sec


 


INR     (<1.2)  


 


APTT     (22.0-30.0)  sec


 


Sodium     (137-145)  mmol/L


 


Potassium     (3.5-5.1)  mmol/L


 


Chloride     ()  mmol/L


 


Carbon Dioxide     (22-30)  mmol/L


 


Anion Gap     mmol/L


 


BUN     (7-17)  mg/dL


 


Creatinine     (0.52-1.04)  mg/dL


 


Est GFR (CKD-EPI)AfAm     (>60 ml/min/1.73 sqM)  


 


Est GFR (CKD-EPI)NonAf     (>60 ml/min/1.73 sqM)  


 


Glucose     (74-99)  mg/dL


 


Plasma Lactic Acid Kristian  1.7    (0.7-2.0)  mmol/L


 


Calcium     (8.4-10.2)  mg/dL


 


Phosphorus     (2.5-4.5)  mg/dL


 


Magnesium     (1.6-2.3)  mg/dL


 


Total Bilirubin     (0.2-1.3)  mg/dL


 


AST     (14-36)  U/L


 


ALT     (4-34)  U/L


 


Alkaline Phosphatase     ()  U/L


 


Troponin I   0.015   (0.000-0.034)  ng/mL


 


Total Protein     (6.3-8.2)  g/dL


 


Albumin     (3.5-5.0)  g/dL


 


TSH     (0.465-4.680)  mIU/L


 


Urine Color    Yellow  


 


Urine Appearance    Slightly Cloudy H  (Clear)  


 


Urine pH    6.5  (5.0-8.0)  


 


Ur Specific Gravity    1.010  (1.001-1.035)  


 


Urine Protein    Negative  (Negative)  


 


Urine Glucose (UA)    Negative  (Negative)  


 


Urine Ketones    Negative  (Negative)  


 


Urine Blood    Negative  (Negative)  


 


Urine Nitrite    Positive H  (Negative)  


 


Urine Bilirubin    Negative  (Negative)  


 


Urine Urobilinogen    0.2  (<2.0)  mg/dL


 


Ur Leukocyte Esterase    Large H  (Negative)  


 


Urine RBC    5  (0-5)  /hpf


 


Urine WBC    40 H  (0-5)  /hpf


 


Ur Squamous Epith Cells    2  (0-4)  /hpf


 


Urine Bacteria    Many H  (None)  /hpf














- EKG Data


-: EKG Interpreted by Me (EKG is A. fib 80 QRS 77 )





- Radiology Data


Radiology results: report reviewed (CT brain C-spine chest and pelvis x-ray 

negative for traumatic injury), image reviewed





Disposition


Clinical Impression: 


 Fall, Dementia, Weakness, UTI (urinary tract infection)





Disposition: HOME SELF-CARE


Condition: Stable


Instructions (If sedation given, give patient instructions):  Fall Prevention 

for Older Adults (ED), Urinary Tract Infection in Women (ED)


Prescriptions: 


Amoxic-Pot Clav 875-125Mg [Augmentin 875-125] 1 tab PO Q12HR #14 tablet


Is patient prescribed a controlled substance at d/c from ED?: No


Referrals: 


Car Rees DO [Primary Care Provider] - 1-2 days


Time of Disposition: 00:25

## 2023-07-29 NOTE — XR
EXAMINATION TYPE: XR chest 1V

 

DATE OF EXAM: 7/29/2023 9:46 PM

 

COMPARISON: Chest radiographs from 1/31/2023

 

TECHNIQUE: XR chest 1V Frontal view of the chest.

 

CLINICAL INDICATION:Female, 89 years old with history of fall; 

 

FINDINGS: 

Lungs/Pleura: Hyperinflation. No evidence of pneumothorax, pleural effusion or focal consolidation. C
hronic senescent parenchymal change.

Pulmonary vascularity: Unremarkable.

Heart/mediastinum: Cardiomediastinal silhouette is enlarged and stable.  Atherosclerotic calcificatio
ns are seen in the aorta. Two lead cardiac conduction device overlying the left hemithorax with lead 
tips projecting over the right ventricle and right atrium.

Musculoskeletal: No acute osseous pathology. High riding bilateral humeral heads suggestive of chroni
c rotator cuff tear. Dextrocurvature of the thoracic spine.

 

 

IMPRESSION: 

1.  No acute cardiopulmonary disease/process.

2.  COPD changes.

## 2023-07-29 NOTE — XR
EXAMINATION TYPE: XR pelvis AP view

 

DATE OF EXAM: 7/29/2023 9:46 PM

 

INDICATION: 

Patient age:Female;  89 years old; 

Reason for study: fall; [facility]. 

 

COMPARISON: Pelvic radiograph 1/28/2023

 

TECHNIQUE: The pelvis was examined in a single projection. 

 

FINDINGS: There is no evidence of fracture or dislocation. Postsurgical changes right total hip arthr
oplasty. Hardware appears intact. Advanced osteophytic changes of the left hip with joint space narro
wing, sclerosis, marginal osteophytosis. There is no soft tissue abnormality.  No abnormal calcificat
ions are present. Multilevel degenerative changes of the lower spine.

 

IMPRESSION: 

1.  No acute osseous pathology.

2.  Right hip arthroplasty changes with hardware in appropriate position.

3.  Moderate left hip osteoarthritic changes.

## 2023-07-29 NOTE — CT
EXAMINATION TYPE: CT brain cspine wo con

CT DLP: 1375.8 mGycm, Automated exposure control for dose reduction was used.

 

DATE OF EXAM: 7/29/2023 9:47 PM

 

COMPARISON: None.

 

CLINICAL INDICATION:Female, 89 years old with history of fall; Fall

 

TECHNIQUE: 

Brain: Multiple axial CT images of the brain were obtained without IV contrast. 

Cspine: Axial CT images from the skull base to the inferior aspect of T2 we obtained without intraven
ous contrast. Coronal and sagittal reformatted images were also reviewed. 

 

FINDINGS:

 

Brain:

Extra-axial spaces: No abnormal extra-axial fluid collections.

Ventricular system: Within normal limits

Cerebral parenchyma: Cerebral atrophy. No acute intraparenchymal hemorrhage or mass effect.  The gray
-white junction is well differentiated. Scattered hypoattenuating areas are seen within the white mat
ter.  

Cerebellum: Unremarkable.

Mass effect: No evidence of midline shift.

Intracranial vasculature: Atherosclerotic calcifications of the intracranial vessels.

Soft tissues: Normal.

Calvarium/osseous structures: No depressed skull fracture.

Paranasal sinuses and mastoid air cells: Clear.

Visualized orbits: Hyperechoic focus within the right globe. Findings similar to priors dating back t
o 6/28/2021.

 

Cervical spine:

Fracture: None.

Osseous structures: Multilevel degenerative disc disease changes with endplate spurring and disc oste
ophyte complex's. 

Vertebral alignment: Within normal limits.

Spinal canal/Neural Foramina: No evidence of significant spinal canal narrowing. No evidence for sign
ificant neural foraminal stenosis.

Neck soft tissues: Prevertebral soft tissues are within normal limits.

Other: The airway is patent. The lung apices are clear. Cardiac conduction leads are partially visual
ized.

 

IMPRESSION:

1.  No acute intracranial process.

2.  No evidence of cervical spine fracture.

3.  Mild multilevel degenerative disc disease.

## 2023-07-30 VITALS — HEART RATE: 72 BPM | SYSTOLIC BLOOD PRESSURE: 112 MMHG | DIASTOLIC BLOOD PRESSURE: 72 MMHG

## 2023-07-30 LAB
PH UR: 6.5 [PH] (ref 5–8)
RBC UR QL: 5 /HPF (ref 0–5)
SP GR UR: 1.01 (ref 1–1.03)
SQUAMOUS UR QL AUTO: 2 /HPF (ref 0–4)
UROBILINOGEN UR QL STRIP: 0.2 MG/DL (ref ?–2)
WBC #/AREA URNS HPF: 40 /HPF (ref 0–5)